# Patient Record
Sex: FEMALE | Race: WHITE
[De-identification: names, ages, dates, MRNs, and addresses within clinical notes are randomized per-mention and may not be internally consistent; named-entity substitution may affect disease eponyms.]

---

## 2019-03-11 NOTE — HISTORY & PHYSICAL
History of Present Illness





- Date of Service


Date of Service for History & Physical: 03/11/19





- History of Present Illness


Admitting Diagnosis: pneumonia, hypoxia, confusion, enlarged hepatic duct


History of Present Illness: 


Dixie Headley is a 69 y/o female brought to ED by family for confusion and 

increased fatigue, chills, increased shortness of breath. She was in her usual 

state of health prior to that. She has been living with her son and DIL for the 

past year as she has been actively treated for ovarian cancer. She had her last 

treatment 4 months ago and is in remission. Past medical history history 

includes current every day smoker, COD, MI- unable to recall year, GERD, 

bipolar disorder, chronic low back pain s/p lumbar laminectomy in 1989, ovarian 

cancer s/p total hysterectomy and chemo/radiation.











Upon arrival to ED SPO2 70% RA, RR 32 and was placed on BiPAP for about an hour 

with significant improvement. Tmax in .1 rectally and was given Afirmiv. 

ABGs showed acute respiratory acidosis with improvement to a compensated state 

after BiPAP use. WBC 14.4, neutrophils 92, Hgb 11.6. ProBNP 971.50. D-dimer 

2.38. Procalcitonin 0.770. Troponin <0.010. EKG- sinus tach, borderline ST 

depression. Head CT negative. Chest CTA negative for PE, + for RLL pneumonia 

and RLL mucus plugging with a possible area of necrosis, severe intrahepatic 

bowel duct dilation. Outside hospital records reviewed, MRI abdomen done by her 

oncologist 1/25/19 2nd ovarian CA showed dilation of CBD due to obstructive 

process and calcification of distal CBD at the level of the pancreatic head, 

pancreatic atrophy. She was stable, on 3L O2 upon transfer to the floor. 














3/11/19- overnight was hypotensive, given fluid bolus with improvement in BP. 

Is resting in bed comfortably, is A&O x 3 but is disoriented to the events of 

the past 24-36 hours. Denies any EAMON, chest pain, chills. Has been afebrile. 

Family reports she lives independently in [place] but has stayed with them due 

to cancer treatment in [place]. Has cut down on her smoking to only 3 

cigarettes per day as well as her beer intake from a case per day to 3 per day.











PCP- Dr Riddle- Kelly


Oncology: Dr Qui   








Travel Screening





- Travel/Exposure Within Last 30 Days


Have you traveled within the last 30 days?: No





- Travel/Exposure Within Last Year


Have you traveled outside the [place] in the last year?: No





- Additonal Travel Details


Have you been exposed to anyone with a communicable illness?: No





- Travel Symptoms


Symptom Screening: Fever (Subjective), Weakness, Chills





Review of Systems


Constitutional: Reports: As per HPI, Weakness.  Denies: Chills, Fever, Malaise, 

Night sweats, Weight change


Eyes: Reports: As per HPI.  Denies: Eye discharge, Eye pain, Photophobia, 

Vision change


ENT: Reports: As per HPI, Congestion.  Denies: Dental pain, Ear pain, Epistaxis

, Hearing loss, Throat pain


Respiratory: Reports: As per HPI, Cough.  Denies: Dyspnea, Hemoptysis, Stridor, 

Wheezes


Cardiovascular: Reports: As per HPI.  Denies: Arrhythmia, Chest pain, Dyspnea 

on exertion, Edema, Murmurs, Orthopnea, Palpitations, Paroxysmal nocturnal 

dyspnea, Rheumatic Fever, Syncope


Endocrine: Reports: As per HPI.  Denies: Fatigue, Heat or cold intolerance, 

Polydipsia, Polyuria


Gastrointestinal: Reports: As per HPI.  Denies: Abdominal pain, Constipation, 

Diarrhea, Hematemesis, Hematochezia, Melena, Nausea, Vomiting


Genitourinary: Reports: As per HPI.  Denies: Abnormal menses, Discharge, 

Dyspareunia, Dysuria, Frequency, Hematuria, Incontinence, Retention, Urgency


Musculoskeletal: Reports: As per HPI.  Denies: Arthralgia, Back pain, Gout, 

Joint swelling, Myalgia, Neck pain


Skin: Reports: As per HPI.  Denies: Bruising, Change in color, Change in hair/

nails, Lesions, Pruritus, Rash


Neurological: Reports: As per HPI, Confusion.  Denies: Abnormal gait, Headache, 

Numbness, Paresthesias, Seizure, Tingling, Tremors, Vertigo, Weakness


Psychiatric: Reports: As per HPI.  Denies: Anxiety, Auditory hallucinations, 

Depression, Homicidal thoughts, Suicidal thoughts, Visual hallucinations


Hematological/Lymphatic: Reports: As per HPI.  Denies: Anemia, Blood Clots, 

Easy bleeding, Easy bruising, Swollen glands





Past Medical History





- SOCIAL HISTORY


Smoking Status: Light tobacco smoker (<10/day)


Alcohol Use: None


Drug Use: None





- RESPIRATORY


Hx Respiratory Disorders: Yes


Hx COPD: Yes





- CARDIOVASCULAR


Hx Cardio Disorders: Yes


Hx Hypertension: Yes





- NEURO


Hx Neuro Disorders: No





- GI


Hx GI Disorders: Yes


Hx Reflux: Yes





- 


Hx Genitourinary Disorders: No





- ENDOCRINE


Hx Endocrine Disorders: No





- MUSCULOSKELETAL


Hx Musculoskeletal Disorders: Yes





- PSYCH


Hx Psych Problems: Yes


Hx Anxiety: Yes


Hx Depression: Yes





- HEMATOLOGY/ONCOLOGY


Hx Hematology/Oncology Disorders: Yes


Hx Cancer: Yes (Ovarian Cancer)


Hx Chemotherapy: Yes (12/2018)


Hx Radiation Therapy: Yes (12/2018)





Family Medical History


Any Significant Family History?: Yes


Hx Cancer: Father, Brother/Sister


Hx Diabetes: Brother/Sister





H&P Meds/Allergies





- Allergies


Allergies: 


 Allergies











Allergy/AdvReac Type Severity Reaction Status Date / Time


 


No Known Drug Allergies Allergy   Verified 03/10/19 20:01














- Home Medications


 Home Medications











 Medication  Instructions  Recorded  Confirmed  Last Taken


 


Alprazolam 1 tab PO BID PRN 03/10/19 03/10/19 Unknown


 


Cyclobenzaprine HCl [Flexeril] 10 mg PO QHS 03/10/19 03/10/19 Unknown


 


Fluoxetine HCl [Prozac] 40 mg PO DAILY 03/10/19 03/10/19 Unknown


 


Ibuprofen [Ibu] 600 mg PO DAILY 03/10/19 03/10/19 Unknown


 


Morphine Sulfate [Morphine Sulfate 60 mg PO BID 03/10/19 03/10/19 Unknown





ER]    


 


Nifedipine [Nifedipine ER] 60 mg PO DAILY 03/10/19 03/10/19 Unknown


 


Olanzapine 10 mg PO QHS 03/10/19 03/10/19 Unknown


 


Quinapril HCl [Accupril] 40 mg PO DAILY 03/10/19 03/10/19 Unknown


 


Trazodone HCl 300 mg PO QHS 03/10/19 03/10/19 Unknown


 


Oxycodone HCl 15 mg PO Q4H PRN 03/11/19 03/11/19 Unknown


 


Ranitidine HCl 150 mg PO BID 03/11/19 03/11/19 Unknown














- Active Medications


Active Medications: 


 Current Medications





Acetaminophen (Tylenol 500mg Tab)  1,000 mg PO Q6H PRN


   PRN Reason: PAIN - MILD(1-4)/FEVER


Albuterol Sulfate (Albuterol Sulfate)  2.5 mg INH RESP.Q4H PRN


   PRN Reason: DIFFICULTY IN BREATHING


Albuterol/Ipratropium (Duoneb)  3 ml INH RESP.Q6H PRN


   PRN Reason: WHEEZING


   Last Admin: 03/11/19 11:20 Dose:  3 ml


Alprazolam (Xanax)  1 mg PO BID PRN


   PRN Reason: ANXIETY


Fluoxetine HCl (Prozac)  40 mg PO DAILY [facility]


   Last Admin: 03/11/19 10:00 Dose:  40 mg


Sodium Chloride ()  1,000 mls @ 100 mls/hr IV .Q10H PRN


   PRN Reason: LARGE VOLUME IV


CEFTRIAXONE 1GM/50ML BAG (Ceftriaxone 1 Gm-D5w Bag)  1 gm in 50 mls @ 100 mls/

hr IVPB Q24H [facility]


Ibuprofen (Motrin 600mg)  600 mg PO DAILY [facility]


   Last Admin: 03/11/19 10:00 Dose:  600 mg


Methylprednisolone Sodium Succinate (Solu-Medrol)  125 mg IVP DAILY [facility]


   Last Admin: 03/11/19 12:32 Dose:  125 mg


Morphine Sulfate ()  60 mg PO Q12H [facility]


   Last Admin: 03/11/19 10:02 Dose:  60 mg


Nifedipine (Procardia Xl)  60 mg PO DAILY [facility]


   Last Admin: 03/11/19 10:03 Dose:  60 mg


Olanzapine (Zyprexa)  10 mg PO QHS [facility]


Oxycodone HCl (Oxy Ir)  15 mg PO Q4H PRN


   PRN Reason: PAIN - MOD TO SEVERE (5-10)


Quinapril HCl (Accupril)  40 mg PO DAILY [facility]


   Last Admin: 03/11/19 10:01 Dose:  40 mg


Ranitidine HCl (Zantac)  150 mg PO BID [facility]


   Last Admin: 03/11/19 12:32 Dose:  150 mg


Trazodone HCl (Desyrel)  300 mg PO QHS [facility]











Physical Exam





- Vital Signs


Vital Signs: 


 Vital Signs - Last 24 Hrs











  Temp Pulse Pulse Pulse Resp BP BP


 


 03/11/19 11:20   89    16  


 


 03/11/19 10:00  97.3 F L    83  16   104/61


 


 03/11/19 09:00      16  


 


 03/11/19 08:17   88    18  


 


 03/11/19 06:00  97.5 F L   64   16   95/49


 


 03/11/19 03:10  97.6 F   60   20  78/59 


 


 03/11/19 02:00    73   20  84/46 


 


 03/11/19 01:30    69   18   79/44


 


 03/11/19 01:15  97.6 F   67   18   86/47


 


 03/11/19 01:02    61   24  84/44 


 


 03/10/19 23:43  97.7 F    77  16  111/63 


 


 03/10/19 23:20     65  20  99/49 


 


 03/10/19 22:24     76  24  82/48 


 


 03/10/19 22:10  102.1 F H      


 


 03/10/19 20:42   111 H    22  


 


 03/10/19 19:58  98.3 F    141 H  32 H  92/66 














  Pulse Ox


 


 03/11/19 11:20  99


 


 03/11/19 10:00  96


 


 03/11/19 09:00 


 


 03/11/19 08:17  94 L


 


 03/11/19 06:00  92 L


 


 03/11/19 03:10  85 L


 


 03/11/19 02:00  89 L


 


 03/11/19 01:30  93 L


 


 03/11/19 01:15  87 L


 


 03/11/19 01:02  94 L


 


 03/10/19 23:43  95


 


 03/10/19 23:20  96


 


 03/10/19 22:24  95


 


 03/10/19 22:10 


 


 03/10/19 20:42  95


 


 03/10/19 19:58  78 L














- General


General Appearance: Alert, Oriented x3, Cooperative, Other (disoriented)





- Head


Head exam: Normal inspection





- Eye


Eye exam: Normal appearance, PERRL, EOMI


Pupils: Normal accommodation





- ENT


ENT exam: Normal exam, Mucous membranes dry, Normal external ear exam, Normal 

orophraynx


Ear exam: Normal external inspection.  negative: External canal tenderness


Nasal Exam: Normal inspection.  negative: Discharge, Sinus tenderness


Mouth exam: Normal external inspection, Tongue normal


Teeth exam: Normal inspection.  negative: Dental caries


Throat exam: Normal inspection.  negative: Tonsillar erythema, Tonsillar exudate





- Neck


Neck exam: Normal inspection, Full ROM.  negative: Tenderness





- Respiratory


Respiratory exam: Normal lung sounds bilaterally.  negative: Accessory muscle 

use, Rales, Rhonchi





- Cardiovascular


Cardiovascular Exam: Normal rhythm, Normal heart sounds


Peripheral Pulses: 2+: Dorsalis Pedis (R), Dorsalis Pedis (L)





- GI/Abdominal


GI/Abdominal exam: Soft, Normal bowel sounds.  negative: Tenderness





- Rectal


Rectal exam: Deferred





- 


 exam: Deferred





- Extremities


Extremities exam: Normal inspection, Full ROM, Normal capillary refill.  

negative: Tenderness





- Back


Back exam: Reports: Normal inspection, Full ROM.  Denies: Muscle spasm, Rash 

noted, Tenderness





- Neurological


Neurological exam: Alert, CN II-XII intact, Oriented X3.  negative: Altered, 

Normal gait





- Psychiatric


Psychiatric exam: Normal affect, Normal mood





- Skin


Skin exam: Dry, Intact.  negative: Cyanosis





Results





- Labs


Result Diagrams: 


 03/11/19 06:20





 03/11/19 06:20


Labs Last 24 Hours: 


 Laboratory Results - last 24 hr











  03/10/19 03/10/19 03/10/19





  20:21 20:21 20:21


 


WBC   14.4 H 


 


RBC   3.87 


 


Hgb   11.6 


 


Hct   38.4 


 


MCV   99.2 H 


 


MCH   30.0 


 


MCHC   30.2 L 


 


RDW   17.9 H 


 


Plt Count   293 


 


MPV   9.0 


 


Neutrophils %   84.0 H 


 


Eosinophils %   Not Reportable 


 


Basophils %   Not Reportable 


 


Lymphocytes   8.0 L 


 


Monocytes   8.0 


 


Basophils   


 


D-Dimer    2.38 H


 


Puncture Site  Left brachial  


 


pCO2  60.4 H  


 


pO2  78.0 L  


 


HCO3  28.6 H  


 


Oxyhemoglobin  89.0 L  


 


ABG pH  7.30 L  


 


ABG O2 Saturation  93.6 L  


 


ABG Base Excess  1.6  


 


[place] Test  Pass  


 


Carboxyhemoglobin  5.6 H  


 


Methemoglobin  Not Reportable  


 


Total Hemoglobin  11.0 L  


 


Actual Respiration Rate  20.0 H  


 


FiO2    


 


Sodium   


 


Potassium   


 


Chloride   


 


Carbon Dioxide   


 


Anion Gap   


 


BUN   


 


Creatinine   


 


Estimated GFR   


 


Random Glucose   


 


Lactic Acid   


 


Calcium   


 


Total Bilirubin   


 


AST   


 


ALT   


 


Alkaline Phosphatase   


 


Troponin T   


 


NT-Pro-B Natriuret Pep   


 


Total Protein   


 


Albumin   


 


Globulin   


 


Albumin/Globulin Ratio   


 


Procalcitonin   


 


Urine Color   


 


Urine Appearance   


 


Urine pH   


 


Ur Specific Gravity   


 


Urine Protein   


 


Urine Glucose (UA)   


 


Urine Ketones   


 


Urine Blood   


 


Urine Nitrite   


 


Urine Bilirubin   


 


Urine Urobilinogen   


 


Ur Leukocyte Esterase   


 


Influenza Type A Ag   


 


Influenza Type B Ag   














  03/10/19 03/10/19 03/10/19





  20:21 22:11 22:35


 


WBC   


 


RBC   


 


Hgb   


 


Hct   


 


MCV   


 


MCH   


 


MCHC   


 


RDW   


 


Plt Count   


 


MPV   


 


Neutrophils %   


 


Eosinophils %   


 


Basophils %   


 


Lymphocytes   


 


Monocytes   


 


Basophils   


 


D-Dimer   


 


Puncture Site    Left brachial


 


pCO2    55.1 H


 


pO2    67.0 L


 


HCO3    30.4 H


 


Oxyhemoglobin    88.7 L


 


ABG pH    7.36


 


ABG O2 Saturation    93.0 L


 


ABG Base Excess    4.7 H


 


[place] Test    Pass


 


Carboxyhemoglobin    5.1 H


 


Methemoglobin    Not Reportable


 


Total Hemoglobin    9.0 L


 


Actual Respiration Rate    20.0 H


 


FiO2    


 


Sodium  138  


 


Potassium  3.4  


 


Chloride  95 L  


 


Carbon Dioxide  28.0  


 


Anion Gap  15.0  


 


BUN  13  


 


Creatinine  0.9  


 


Estimated GFR  > 60  


 


Random Glucose  211 H  


 


Lactic Acid   


 


Calcium  9.5  


 


Total Bilirubin  0.40  


 


AST  12  


 


ALT  9  


 


Alkaline Phosphatase  72  


 


Troponin T   


 


NT-Pro-B Natriuret Pep  971.50 H  


 


Total Protein  8.4  


 


Albumin  3.8 L  


 


Globulin  4.6  


 


Albumin/Globulin Ratio  0.8 L  


 


Procalcitonin   


 


Urine Color   Yellow 


 


Urine Appearance   Clear 


 


Urine pH   5.5 


 


Ur Specific Gravity   1.025 


 


Urine Protein   Trace H 


 


Urine Glucose (UA)   Negative 


 


Urine Ketones   Negative 


 


Urine Blood   Negative 


 


Urine Nitrite   Negative 


 


Urine Bilirubin   Negative 


 


Urine Urobilinogen   0.2 


 


Ur Leukocyte Esterase   Negative 


 


Influenza Type A Ag   


 


Influenza Type B Ag   














  03/10/19 03/11/19 03/11/19





  22:48 06:20 06:20


 


WBC   9.0 


 


RBC   3.78 L 


 


Hgb   11.4 L 


 


Hct   37.4 


 


MCV   98.9 H 


 


MCH   30.1 


 


MCHC   30.5 L 


 


RDW   17.6 H 


 


Plt Count   245 


 


MPV   9.1 


 


Neutrophils %   92.0 H 


 


Eosinophils %   Not Reportable 


 


Basophils %   Not Reportable 


 


Lymphocytes   6.0 L 


 


Monocytes   1.0 


 


Basophils   1.0 


 


D-Dimer   


 


Puncture Site   


 


pCO2   


 


pO2   


 


HCO3   


 


Oxyhemoglobin   


 


ABG pH   


 


ABG O2 Saturation   


 


ABG Base Excess   


 


[place] Test   


 


Carboxyhemoglobin   


 


Methemoglobin   


 


Total Hemoglobin   


 


Actual Respiration Rate   


 


FiO2   


 


Sodium    137


 


Potassium    3.8


 


Chloride    100


 


Carbon Dioxide    27.0


 


Anion Gap    10.0


 


BUN    14


 


Creatinine    0.7


 


Estimated GFR    > 60


 


Random Glucose    156 H


 


Lactic Acid  1.1  


 


Calcium    9.2


 


Total Bilirubin    0.20


 


AST    12


 


ALT    10


 


Alkaline Phosphatase    72


 


Troponin T   


 


NT-Pro-B Natriuret Pep   


 


Total Protein    8.1


 


Albumin    3.7 L


 


Globulin    4.4


 


Albumin/Globulin Ratio    0.8 L


 


Procalcitonin   


 


Urine Color   


 


Urine Appearance   


 


Urine pH   


 


Ur Specific Gravity   


 


Urine Protein   


 


Urine Glucose (UA)   


 


Urine Ketones   


 


Urine Blood   


 


Urine Nitrite   


 


Urine Bilirubin   


 


Urine Urobilinogen   


 


Ur Leukocyte Esterase   


 


Influenza Type A Ag   


 


Influenza Type B Ag   














  03/11/19 03/11/19





  06:20 12:49


 


WBC  


 


RBC  


 


Hgb  


 


Hct  


 


MCV  


 


MCH  


 


MCHC  


 


RDW  


 


Plt Count  


 


MPV  


 


Neutrophils %  


 


Eosinophils %  


 


Basophils %  


 


Lymphocytes  


 


Monocytes  


 


Basophils  


 


D-Dimer  


 


Puncture Site  


 


pCO2  


 


pO2  


 


HCO3  


 


Oxyhemoglobin  


 


ABG pH  


 


ABG O2 Saturation  


 


ABG Base Excess  


 


[place] Test  


 


Carboxyhemoglobin  


 


Methemoglobin  


 


Total Hemoglobin  


 


Actual Respiration Rate  


 


FiO2  


 


Sodium  


 


Potassium  


 


Chloride  


 


Carbon Dioxide  


 


Anion Gap  


 


BUN  


 


Creatinine  


 


Estimated GFR  


 


Random Glucose  


 


Lactic Acid  


 


Calcium  


 


Total Bilirubin  


 


AST  


 


ALT  


 


Alkaline Phosphatase  


 


Troponin T  < 0.010 


 


NT-Pro-B Natriuret Pep  


 


Total Protein  


 


Albumin  


 


Globulin  


 


Albumin/Globulin Ratio  


 


Procalcitonin  0.770 


 


Urine Color  


 


Urine Appearance  


 


Urine pH  


 


Ur Specific Gravity  


 


Urine Protein  


 


Urine Glucose (UA)  


 


Urine Ketones  


 


Urine Blood  


 


Urine Nitrite  


 


Urine Bilirubin  


 


Urine Urobilinogen  


 


Ur Leukocyte Esterase  


 


Influenza Type A Ag   Negative


 


Influenza Type B Ag   Negative














VTE H&P Assessment





- Risk for VTE


Risk for VTE: Yes


Risk Level: Moderate


Risk Assessment Date: 03/11/19


Risk Assessment Time: 14:47


VTE Orders Placed or Will Be Placed: Yes





Plan





- Inpatient Certification


Inpatient Certification: 


Admit to inpatient care: Based on my medical assessment, after consideration of 

patient's risk factors (age, co-morbidities and patient presenting symptoms and 

acuity), I expect that this patient will remain in the hospital greater than or 

equal to two midnights and that the services needed warrant inpatient care 

because:





Patient Risk Factors: [respiratory failure, acute mental status changes]





Estimated length of stay: [72-96 hrs]  The patient may reasonably be expected 

to be discharged or transferred to a hospital within 96 hours after admission 

to [facility].





Services needed: [nursing]





Post hospital care (if known): []





I certify that my determination is in accordance with my understanding of 

Medicare requirements for reasonable and necessary inpatient services.





03/11/19 14:48








- Detailed Diagnosis and Plan


(1) Pneumonia


Current Visit: Yes   Status: Acute   


Qualifiers: 


   Pneumonia type: due to unspecified organism   Laterality: right   Lung 

location: lower lobe of lung   Qualified Code(s): J18.1 - Lobar pneumonia, 

unspecified organism   


Base Code: J18.9 - PNEUMONIA, UNSPECIFIED ORGANISM   Comment: 3/11/19


- CTA chest- RLL pneumonia, possible necrosis, recommend repeat Chest CTA in 3 

months


- Duoneb Q 6r WA, Albuterol 2.5mg Q 4hr PRN


- Rocephin 1gm BID


- Azithromycin 50mg IV Q 24 hrs


- O2 to keep SPO2> 88%- currently 94% on 4L, in no respiratory distress


- Solumedrol 125mg IVP QD


- BC x2 pending


- Procalcitonin in am


- WBC normalized this am, neutrophils 92   





(2) Hypoxia


Current Visit: Yes   Status: Acute   Base Code: R09.02 - HYPOXEMIA   Comment: 3/

11/19


- See above


- Significant improvement ater BiPAP application in the ED, has not required 

since admission


- O2 to keep SPO2>88%


- Influenza PCR pending   





(3) Chronic pain


Current Visit: Yes   Status: Acute   Base Code: G89.29 - OTHER CHRONIC PAIN   

Comment: 3/11/19


- Chronic low back pain, s/p lumbar laminectomy in 1989, recent chemo/radiation 

for ovarian cancer


- Oxy IR 15mg Q 4hr PRN per home dosing


- Morphine Sulfate 60mg Q 12 hr per home dosing


   





(4) HTN (hypertension)


Current Visit: Yes   Status: Acute   Base Code: I10 - ESSENTIAL (PRIMARY) 

HYPERTENSION   Comment: 3/11/19


- Hypotensive overnight, improved after 250ml fluid bolus


- Nursing to hold Accupril and Procardia if SBP<100


-    





(5) DVT prophylaxis


Current Visit: Yes   Status: Acute   Base Code: [id number] -    Comment: 3/11/19


- Nursing to encourage ambulation


- Lovenox 40mg SQ QD   





(6) Full code status


Current Visit: Yes   Status: Acute   Base Code: Z78.9 - OTHER SPECIFIED HEALTH 

STATUS   Comment: 3/11/19

## 2019-03-11 NOTE — EMERGENCY DEPARTMENT RECORD
History of Present Illness





- General


Chief Complaint: Cough


Stated Complaint: COUGH,CONFUSION


Time Seen by Provider: 03/10/19 20:19


Source: Family


Mode of Arrival: Ambulatory


Limitations: Altered mental status





- History of Present Illness


Initial Comments: 





pt brought in by family because she has been acting confused since the day PTA.

  she has a hx of ovarian ca and finished chemo and radiation 4 mos ago.  she 

has been increasingly sob.  pt has also had chills and shakes and been sleeping 

a lot.  her sats aupon arrival were in the 70s.  she does not wear O2.  she 

still smokes


Onset/Timin


Severity: Severe


Consistency: Constant, Getting worse


Improves With: Nothing


Known History Of: COPD


Context: Recent illness


Associated Symptoms: Cough





- Related Data


 Home Medications











 Medication  Instructions  Recorded  Confirmed  Last Taken


 


Alprazolam 1 tab PO BID PRN 03/10/19 03/10/19 Unknown


 


Cyclobenzaprine HCl [Flexeril] 10 mg PO QHS 03/10/19 03/10/19 Unknown


 


Fluoxetine HCl [Prozac] 40 mg PO DAILY 03/10/19 03/10/19 Unknown


 


Ibuprofen [Ibu] 600 mg PO DAILY 03/10/19 03/10/19 Unknown


 


Morphine Sulfate [Morphine Sulfate 60 mg PO BID 03/10/19 03/10/19 Unknown





ER]    


 


Nifedipine [Nifedipine ER] 60 mg PO DAILY 03/10/19 03/10/19 Unknown


 


Olanzapine 10 mg PO QHS 03/10/19 03/10/19 Unknown


 


Oxycodone HCl/Acetaminophen 1 tab PO Q4H PRN 03/10/19 03/10/19 Unknown





[Percocet 5mg/325mg]    


 


Quinapril HCl [Accupril] 40 mg PO DAILY 03/10/19 03/10/19 Unknown


 


Trazodone HCl 300 mg PO QHS 03/10/19 03/10/19 Unknown











 Allergies











Allergy/AdvReac Type Severity Reaction Status Date / Time


 


No Known Drug Allergies Allergy   Verified 03/10/19 20:01














Travel Screening





- Travel/Exposure Within Last 30 Days


Have you traveled within the last 30 days?: No





- Travel Symptoms


Symptom Screening: None





Review of Systems


Reviewed: No additional complaints except as noted below


Constitutional: Reports: As per HPI, Chills, Fever, Weakness.  Denies: Malaise, 

Night sweats, Weight change


Eyes: Reports: As per HPI.  Denies: Eye discharge, Eye pain, Photophobia, 

Vision change


ENT: Reports: As per HPI, Congestion.  Denies: Dental pain, Ear pain, Epistaxis

, Hearing loss, Throat pain


Respiratory: Reports: As per HPI, Cough, Dyspnea.  Denies: Hemoptysis, Stridor, 

Wheezes


Cardiovascular: Reports: As per HPI.  Denies: Arrhythmia, Chest pain, Dyspnea 

on exertion, Edema, Murmurs, Orthopnea, Palpitations, Paroxysmal nocturnal 

dyspnea, Rheumatic Fever, Syncope


Endocrine: Reports: As per HPI.  Denies: Fatigue, Heat or cold intolerance, 

Polydipsia, Polyuria


Gastrointestinal: Reports: As per HPI.  Denies: Abdominal pain, Constipation, 

Diarrhea, Hematemesis, Hematochezia, Melena, Nausea, Vomiting


Genitourinary: Reports: As per HPI.  Denies: Abnormal menses, Discharge, 

Dyspareunia, Dysuria, Frequency, Hematuria, Incontinence, Retention, Urgency


Musculoskeletal: Reports: As per HPI.  Denies: Arthralgia, Back pain, Gout, 

Joint swelling, Myalgia, Neck pain


Skin: Reports: As per HPI.  Denies: Bruising, Change in color, Change in hair/

nails, Lesions, Pruritus, Rash


Neurological: Reports: As per HPI, Confusion.  Denies: Abnormal gait, Headache, 

Numbness, Paresthesias, Seizure, Tingling, Tremors, Vertigo, Weakness


Psychiatric: Reports: As per HPI.  Denies: Anxiety, Auditory hallucinations, 

Depression, Homicidal thoughts, Suicidal thoughts, Visual hallucinations


Hematological/Lymphatic: Reports: As per HPI.  Denies: Anemia, Blood Clots, 

Easy bleeding, Easy bruising, Swollen glands





Past Medical History





- SOCIAL HISTORY


Smoking Status: Light tobacco smoker (<10/day)


Alcohol Use: None


Drug Use: None





- RESPIRATORY


Hx Respiratory Disorders: Yes


Hx COPD: Yes





- CARDIOVASCULAR


Hx Cardio Disorders: Yes


Hx Heart Attack: Yes





- NEURO


Hx Neuro Disorders: No





- GI


Hx GI Disorders: Yes


Hx Reflux: Yes





- 


Hx Genitourinary Disorders: No





- ENDOCRINE


Hx Endocrine Disorders: No





- MUSCULOSKELETAL


Hx Musculoskeletal Disorders: Yes





- PSYCH


Hx Psych Problems: Yes


Hx Anxiety: Yes


Hx Depression: Yes





- HEMATOLOGY/ONCOLOGY


Hx Hematology/Oncology Disorders: Yes


Hx Cancer: Yes (Ovarian Cancer)


Hx Chemotherapy: Yes (2018)


Hx Radiation Therapy: Yes (2018)





Family Medical History


Any Significant Family History?: Yes


Hx Cancer: Father, Brother/Sister


Hx Diabetes: Brother/Sister





Physical Exam





- General


General Appearance: Alert, Cooperative, Severe distress, Other (confused)





- Head


Head exam: Normal inspection





- Eye


Eye exam: Normal appearance, PERRL, EOMI


Pupils: Normal accommodation





- ENT


ENT exam: Normal exam, Mucous membranes dry, Normal external ear exam, Normal 

orophraynx


Ear exam: Normal external inspection.  negative: External canal tenderness


Nasal Exam: Normal inspection.  negative: Discharge, Sinus tenderness


Mouth exam: Normal external inspection, Tongue normal


Teeth exam: Normal inspection.  negative: Dental caries


Throat exam: Normal inspection.  negative: Tonsillar erythema, Tonsillar exudate





- Neck


Neck exam: Normal inspection, Full ROM.  negative: Tenderness





- Respiratory


Respiratory exam: Accessory muscle use, Rales, Respiratory distress, Rhonchi





- Cardiovascular


Cardiovascular Exam: Normal rhythm, Normal heart sounds, Tachycardia





- GI/Abdominal


GI/Abdominal exam: Soft, Normal bowel sounds.  negative: Tenderness





- Rectal


Rectal exam: Deferred





- 


 exam: Deferred





- Extremities


Extremities exam: Normal inspection, Full ROM, Normal capillary refill.  

negative: Tenderness





- Back


Back exam: Reports: Normal inspection, Full ROM.  Denies: Muscle spasm, Rash 

noted, Tenderness





- Neurological


Neurological exam: Altered.  negative: Normal gait, Oriented X3





- Psychiatric


Psychiatric exam: Normal affect, Normal mood





- Skin


Skin exam: Cyanosis, Dry, Intact





Course





 Vital Signs











  03/10/19 03/10/19 03/10/19





  19:58 20:42 22:10


 


Temperature 98.3 F  102.1 F H


 


Pulse Rate  111 H 


 


Pulse Rate [ 141 H  





Pulse Ox Probe]   


 


Respiratory 32 H 22 





Rate   


 


Blood Pressure 92/66  





[Left Arm]   


 


Pulse Ox 78 L 95 














  03/10/19 03/10/19 03/10/19





  22:24 23:20 23:43


 


Temperature   97.7 F


 


Pulse Rate   


 


Pulse Rate [ 76 65 77





Pulse Ox Probe]   


 


Respiratory 24 20 16





Rate   


 


Blood Pressure 82/48 99/49 111/63





[Left Arm]   


 


Pulse Ox 95 96 95














- Reevaluation(s)


Reevaluation #1: 





19 00:36


pt was immediately placed on bipap and given treatment. she gradually improved. 

her vitals improved. ct shows pneumonia with concern for biliary duct 

dilatation. pt has no ap. her mentation improved





Medical Decision Making





- Lab Data


Result diagrams: 


 03/10/19 20:21





 03/10/19 20:21





 Lab Results











  03/10/19 03/10/19 03/10/19 Range/Units





  20:21 20:21 20:21 


 


WBC   14.4 H   (4.2-12.2)  K/uL


 


RBC   3.87   (3.80-5.40)  M/uL


 


Hgb   11.6   (11.6-16.0)  gm/dl


 


Hct   38.4   (35.0-47.0)  %


 


MCV   99.2 H   (81-97)  fl


 


MCH   30.0   (27-33)  pg


 


MCHC   30.2 L   (32-36)  g/dl


 


RDW   17.9 H   (11.5-14.5)  %


 


Plt Count   293   (130-400)  K/uL


 


MPV   9.0   (7.4-10.4)  fl


 


Neutrophils %   84.0 H   (47-80)  %


 


Eosinophils %   Not Reportable   


 


Basophils %   Not Reportable   


 


Lymphocytes   8.0 L   (16-45)  %


 


Monocytes   8.0   (0-9)  %


 


D-Dimer    2.38 H  (0-0.59)  mg/L FEU


 


Puncture Site  Left brachial    


 


pCO2  60.4 H    (35-48)  mmHg


 


pO2  78.0 L    ()  mmHg


 


HCO3  28.6 H    (18-23)  mmol/L


 


Oxyhemoglobin  89.0 L    (94-99)  % vol


 


ABG pH  7.30 L    (7.35-7.45)  


 


ABG O2 Saturation  93.6 L    (95-98)  %


 


ABG Base Excess  1.6    (-2 - 3)  mmol/L


 


[place] Test  Pass    


 


Carboxyhemoglobin  5.6 H    (0-1.5)  %


 


Methemoglobin  Not Reportable    


 


Total Hemoglobin  11.0 L    (11.6-16)  g/dl


 


Actual Respiration Rate  20.0 H    (10-18)  /MIN


 


FiO2      %


 


Sodium     (136-145)  mmol/L


 


Potassium     (3.4-4.5)  mmol/L


 


Chloride     ()  mmol/L


 


Carbon Dioxide     (22-29)  mmol/L


 


Anion Gap     (7-16)  


 


BUN     (8-23)  mg/dL


 


Creatinine     (0.5-0.9)  mg/dL


 


Estimated GFR     mL/min


 


Random Glucose     ()  mg/dL


 


Lactic Acid     (0.5-2.2)  mmol/L


 


Calcium     (8.8-10.2)  mg/dL


 


Total Bilirubin     (0.2-1.0)  mg/dL


 


AST     (10.0-35.0)  U/L


 


ALT     (<33)  U/L


 


Alkaline Phosphatase     ()  U/L


 


NT-Pro-B Natriuret Pep     (<125)  pg/mL


 


Total Protein     (6.6-8.7)  g/dL


 


Albumin     (4.0-5.0)  g/dL


 


Globulin     (1.4-4.8)  gm/dL


 


Albumin/Globulin Ratio     (1.1-1.8)  


 


Urine Color     


 


Urine Appearance     


 


Urine pH     (5.0-8.0)  


 


Ur Specific Gravity     (1.002-1.030)  


 


Urine Protein     (NEGATIVE)  


 


Urine Glucose (UA)     (NEGATIVE)  


 


Urine Ketones     (NEGATIVE)  


 


Urine Blood     (NEGATIVE)  


 


Urine Nitrite     (NEGATIVE)  


 


Urine Bilirubin     (NEGATIVE)  


 


Urine Urobilinogen     (0.20 - 1.00)  E.U./dL


 


Ur Leukocyte Esterase     (NEGATIVE)  














  03/10/19 03/10/19 03/10/19 Range/Units





  20:21 22:11 22:35 


 


WBC     (4.2-12.2)  K/uL


 


RBC     (3.80-5.40)  M/uL


 


Hgb     (11.6-16.0)  gm/dl


 


Hct     (35.0-47.0)  %


 


MCV     (81-97)  fl


 


MCH     (27-33)  pg


 


MCHC     (32-36)  g/dl


 


RDW     (11.5-14.5)  %


 


Plt Count     (130-400)  K/uL


 


MPV     (7.4-10.4)  fl


 


Neutrophils %     (47-80)  %


 


Eosinophils %     


 


Basophils %     


 


Lymphocytes     (16-45)  %


 


Monocytes     (0-9)  %


 


D-Dimer     (0-0.59)  mg/L FEU


 


Puncture Site    Left brachial  


 


pCO2    55.1 H  (35-48)  mmHg


 


pO2    67.0 L  ()  mmHg


 


HCO3    30.4 H  (18-23)  mmol/L


 


Oxyhemoglobin    88.7 L  (94-99)  % vol


 


ABG pH    7.36  (7.35-7.45)  


 


ABG O2 Saturation    93.0 L  (95-98)  %


 


ABG Base Excess    4.7 H  (-2 - 3)  mmol/L


 


[place] Test    Pass  


 


Carboxyhemoglobin    5.1 H  (0-1.5)  %


 


Methemoglobin    Not Reportable  


 


Total Hemoglobin    9.0 L  (11.6-16)  g/dl


 


Actual Respiration Rate    20.0 H  (10-18)  /MIN


 


FiO2      %


 


Sodium  138    (136-145)  mmol/L


 


Potassium  3.4    (3.4-4.5)  mmol/L


 


Chloride  95 L    ()  mmol/L


 


Carbon Dioxide  28.0    (22-29)  mmol/L


 


Anion Gap  15.0    (7-16)  


 


BUN  13    (8-23)  mg/dL


 


Creatinine  0.9    (0.5-0.9)  mg/dL


 


Estimated GFR  > 60    mL/min


 


Random Glucose  211 H    ()  mg/dL


 


Lactic Acid     (0.5-2.2)  mmol/L


 


Calcium  9.5    (8.8-10.2)  mg/dL


 


Total Bilirubin  0.40    (0.2-1.0)  mg/dL


 


AST  12    (10.0-35.0)  U/L


 


ALT  9    (<33)  U/L


 


Alkaline Phosphatase  72    ()  U/L


 


NT-Pro-B Natriuret Pep  971.50 H    (<125)  pg/mL


 


Total Protein  8.4    (6.6-8.7)  g/dL


 


Albumin  3.8 L    (4.0-5.0)  g/dL


 


Globulin  4.6    (1.4-4.8)  gm/dL


 


Albumin/Globulin Ratio  0.8 L    (1.1-1.8)  


 


Urine Color   Yellow   


 


Urine Appearance   Clear   


 


Urine pH   5.5   (5.0-8.0)  


 


Ur Specific Gravity   1.025   (1.002-1.030)  


 


Urine Protein   Trace H   (NEGATIVE)  


 


Urine Glucose (UA)   Negative   (NEGATIVE)  


 


Urine Ketones   Negative   (NEGATIVE)  


 


Urine Blood   Negative   (NEGATIVE)  


 


Urine Nitrite   Negative   (NEGATIVE)  


 


Urine Bilirubin   Negative   (NEGATIVE)  


 


Urine Urobilinogen   0.2   (0.20 - 1.00)  E.U./dL


 


Ur Leukocyte Esterase   Negative   (NEGATIVE)  














  03/10/19 Range/Units





  22:48 


 


WBC   (4.2-12.2)  K/uL


 


RBC   (3.80-5.40)  M/uL


 


Hgb   (11.6-16.0)  gm/dl


 


Hct   (35.0-47.0)  %


 


MCV   (81-97)  fl


 


MCH   (27-33)  pg


 


MCHC   (32-36)  g/dl


 


RDW   (11.5-14.5)  %


 


Plt Count   (130-400)  K/uL


 


MPV   (7.4-10.4)  fl


 


Neutrophils %   (47-80)  %


 


Eosinophils %   


 


Basophils %   


 


Lymphocytes   (16-45)  %


 


Monocytes   (0-9)  %


 


D-Dimer   (0-0.59)  mg/L FEU


 


Puncture Site   


 


pCO2   (35-48)  mmHg


 


pO2   ()  mmHg


 


HCO3   (18-23)  mmol/L


 


Oxyhemoglobin   (94-99)  % vol


 


ABG pH   (7.35-7.45)  


 


ABG O2 Saturation   (95-98)  %


 


ABG Base Excess   (-2 - 3)  mmol/L


 


[place] Test   


 


Carboxyhemoglobin   (0-1.5)  %


 


Methemoglobin   


 


Total Hemoglobin   (11.6-16)  g/dl


 


Actual Respiration Rate   (10-18)  /MIN


 


FiO2   %


 


Sodium   (136-145)  mmol/L


 


Potassium   (3.4-4.5)  mmol/L


 


Chloride   ()  mmol/L


 


Carbon Dioxide   (22-29)  mmol/L


 


Anion Gap   (7-16)  


 


BUN   (8-23)  mg/dL


 


Creatinine   (0.5-0.9)  mg/dL


 


Estimated GFR   mL/min


 


Random Glucose   ()  mg/dL


 


Lactic Acid  1.1  (0.5-2.2)  mmol/L


 


Calcium   (8.8-10.2)  mg/dL


 


Total Bilirubin   (0.2-1.0)  mg/dL


 


AST   (10.0-35.0)  U/L


 


ALT   (<33)  U/L


 


Alkaline Phosphatase   ()  U/L


 


NT-Pro-B Natriuret Pep   (<125)  pg/mL


 


Total Protein   (6.6-8.7)  g/dL


 


Albumin   (4.0-5.0)  g/dL


 


Globulin   (1.4-4.8)  gm/dL


 


Albumin/Globulin Ratio   (1.1-1.8)  


 


Urine Color   


 


Urine Appearance   


 


Urine pH   (5.0-8.0)  


 


Ur Specific Gravity   (1.002-1.030)  


 


Urine Protein   (NEGATIVE)  


 


Urine Glucose (UA)   (NEGATIVE)  


 


Urine Ketones   (NEGATIVE)  


 


Urine Blood   (NEGATIVE)  


 


Urine Nitrite   (NEGATIVE)  


 


Urine Bilirubin   (NEGATIVE)  


 


Urine Urobilinogen   (0.20 - 1.00)  E.U./dL


 


Ur Leukocyte Esterase   (NEGATIVE)  














Critical Care Time


Critical Care Time: Yes


Total Critical Care Time: 60





Disposition


Disposition: Admit


Clinical Impression: 


 Hypoxia





Pneumonia


Qualifiers:


 Pneumonia type: due to unspecified organism Laterality: right Lung location: 

lower lobe of lung Qualified Code(s): J18.1 - Lobar pneumonia, unspecified 

organism





Disposition: Still a Patient at [facility]


Decision to Admit: Admit from ER


Decision to Admit Date: 19


Decision to Admit Time: 00:43





Quality





- Quality Measures


Quality Measures: N/A





- Blood Pressure Screening


Does Patient Have Any of the Following: No


Blood Pressure Classification: Normal BP Reading


Systolic Measurement: 111


Diastolic Measurement: 63


Screening for High Blood Pressure: < Normal BP, F/U Not Required > []

## 2019-03-11 NOTE — CT SCAN REPORT
EXAM:  CT OF THE HEAD WITHOUT CONTRAST 



HISTORY:  CONFUSION.   



TECHNIQUE:  Standard CT imaging of the head was obtained without intravenous 
contrast.  



Comparison:  None.  



Hand dominance:  Unknown.  



FINDINGS:  The ventricles and sulci are normal in size.  No intracranial 
hemorrhage, extraaxial fluid collection, or large acute infarct is identified.  
No significant mass effect or midline shift.  The basal cisterns are 
maintained.  The visualized paranasal sinuses and mastoid air cells are clear.  



IMPRESSION:  

NEGATIVE NONCONTRAST HEAD CT.  



JOB NUMBER:  [id number]
[facility]

## 2019-03-11 NOTE — CT ANGIOGRAM REPORT
EXAM:  CTA OF THE CHEST WITH  CONTRAST 



HISTORY:  SHORTNESS OF BREATH. 



TECHNIQUE:  Standard CT angiography of the chest was obtained after intravenous 
contrast.  Coronal and sagittal reformations are provided and MIP reformations 
are also created.  



Comparison:  None.  



FINDINGS:  There is moderate stenosis in the proximal left subclavian artery by 
noncalcified plaque.  No filling defect is identified within the pulmonary 
arteries.  There is a right chest wall robyn-cath in place.  There are multiple 
enlarged mediastinal lymph nodes measuring up to 16 mm in the lower right 
paratracheal region and 22 mm in the subcarinal region.  There are also 
enlarged right hilar lymph nodes measuring up to 17 mm.  There is a trace right 
pleural effusion.  There is severe centrilobular emphysema.  There is extensive 
mucous plugging in the right lower lobe.  There is consolidation of the right 
lower lobe dependently with areas of nonenhancement which may represent areas 
of necrosis.  No destructive osseous lesion is identified.  There is severe 
intrahepatic bowel like dilatation within the visualized aspects of the liver.  
There is also a large amount of stool in the visualized colon.  



IMPRESSION:  

1.  NO EVIDENCE FOR A PULMONARY EMBOLUS.  



2.  RIGHT LOWER LOBE CONSOLIDATION WHICH MAY REPRESENT PNEUMONIA OR ASPIRATION 
GIVEN THE EXTENSIVE RIGHT LOWER LOBE MUCOUS PLUGGING.  THERE ARE AREAS OF 
NONENHANCEMENT WITHIN THE CONSOLIDATION WHICH MAY REPRESENT AREAS OF NECROSIS.  
FOLLOW-UP CHEST CT IS RECOMMENDED IN THREE MONTHS TO INSURE RESOLUTION AS AN 
UNDERLYING MASS IS NOT EXCLUDED.  



3.  MARKED RIGHT HILAR AND MEDIASTINAL ADENOPATHY, WHICH CAN BE FURTHER 
ASSESSED ON FOLLOW-UP CHEST CT.  



4.  SEVERE EMPHYSEMA.  



5.  SEVERE INTRAHEPATIC BOWEL DUCT DILATATION.  THERE ARE NO PRIORS AVAILABLE 
FOR COMPARISON.  THIS COULD BE FURTHER ASSESSED WITH CONTRAST ENHANCED CT OF 
THE ABDOMEN AND PELVIS.  



6.  LARGE COLONIC STOOL BURDEN.  



JOB NUMBER:  [id number]
[facility]

## 2019-03-13 NOTE — DISCHARGE NOTE
VTE H&P Assessment





- Risk for VTE


Risk for VTE: Yes


Risk Level: Moderate


Risk Assessment Date: 03/11/19


Risk Assessment Time: 14:47


VTE Orders Placed or Will Be Placed: Yes





Discharge Medications





- Discharge Medications


Prescriptions: 


Azithromycin [Zithromax] 500 mg PO DAILY #8 tab


Cephalexin [Keflex] 500 mg PO QID #40 cap


Prednisone [Prednisone 10Mg] 10 mg PO ASDIR #30 tab


Home Medications: 


 Ambulatory Orders





Alprazolam 1 tab PO BID PRN 03/10/19 [Last Taken Unknown]


Cyclobenzaprine HCl [Flexeril] 10 mg PO QHS 03/10/19 [Last Taken Unknown]


Fluoxetine HCl [Prozac] 40 mg PO DAILY 03/10/19 [Last Taken Unknown]


Ibuprofen [Ibu] 600 mg PO DAILY 03/10/19 [Last Taken Unknown]


Morphine Sulfate [Morphine Sulfate ER] 60 mg PO BID 03/10/19 [Last Taken Unknown

]


Nifedipine [Nifedipine ER] 60 mg PO DAILY 03/10/19 [Last Taken Unknown]


Olanzapine 10 mg PO QHS 03/10/19 [Last Taken Unknown]


Quinapril HCl [Accupril] 40 mg PO DAILY 03/10/19 [Last Taken Unknown]


Trazodone HCl 300 mg PO QHS 03/10/19 [Last Taken Unknown]


Oxycodone HCl 15 mg PO Q4H PRN 03/11/19 [Last Taken Unknown]


Ranitidine HCl 150 mg PO BID 03/11/19 [Last Taken Unknown]


Azithromycin [Zithromax] 500 mg PO DAILY #8 tab 03/13/19 [Last Taken Unknown]


Cephalexin [Keflex] 500 mg PO QID #40 cap 03/13/19 [Last Taken Unknown]


Prednisone [Prednisone 10Mg] 10 mg PO ASDIR #30 tab 03/13/19 [Last Taken Unknown

]











Discharge Note





- Date


Date of Discharge Note: 03/13/19


Disposition: Home, Self-Care


Condition: (2) Stable


Additional Instructions: 


follow up with family  in one week and if not able to get back to [place] 

she would follow up with Dr Shah on Monday at 10:00 am in my office


Patient needs oxygen 2 liters at rest and 4 liters with ambulation will set up 

home oxygen.


Prescriptions: 


Azithromycin [Zithromax] 500 mg PO DAILY #8 tab


Cephalexin [Keflex] 500 mg PO QID #40 cap


Prednisone [Prednisone 10Mg] 10 mg PO ASDIR #30 tab


Forms:  Patient Portal Access

## 2019-03-14 NOTE — DISCHARGE SUMMARY
DATE: 03/13/2019 at 10:59 a.m. 



DISCHARGE DIAGNOSES: 

1. Right lower lobe pneumonia. 

2. Chronic obstructive pulmonary disease. 

3. Ovarian cancer. 

4. Recent chemotherapy finished 3-4 weeks ago. She states that she is remission. She is being 
treated at [facility] in the oncology group. 

5. Tobacco use. 

6. Anxiety disorder. 

7. Chronic low back pain and on narcotics. 

8. Gastroesophageal reflux disease. 

9. Hypertension. 



ATTENDING PHYSICIAN: Kenny Shah DO



REASON FOR HOSPITALIZATION: This 68-year-old female presented to the emergency department confused, 
coughing, chills, and shakes. Evaluated in the emergency department by Dr. Burnett and admitted to the 
hospital for pneumonia, right lower lobe pneumonia, for IV antibiotics and oxygen therapy and 
further evaluation. 



SIGNIFICANT FINDINGS: D-dimer was elevated with a value of 2.38. CTA of the chest was done which 
showed no PEs and an infiltrate in the right lower lobe. Her white count initially was 14,400, 
hemoglobin 11.6, platelets 293,000. Blood gas revealed pH of 7.30, PO2 is 78, PCO2 60.4 on 35% 
oxygen. 



SIGNIFICANT FINDINGS: Sodium on discharge is 137, potassium 3.8, chloride 100, BUN 14, creatinine 
0.7. Her lactic acid in the ER was 1.1. Her total bili is 0.2, AST 12, ALT 10, troponin T was 
negative. She had a procalcitonin which was elevated at 0.77, dropped down to 0.29 on 03/13/2019. 
Her urine was negative. Influenza was negative in A and B. Her white count on 03/11/2019 was 9000, 
hemoglobin 11.4. Head CT negative, non-contrast head CT. The CTA chest showed no pulmonary embolism. 
Right lower lobe consolidation which may represent pneumonia. Right hilar and mediastinal adenopathy 
which can be further assessed with followup chest CT. In view of her ovarian cancer diagnosis, this 
will be followed up. Severe emphysema. Severe intrahepatic bile duct dilation. No prior available 
comparisons. EKG showing sinus tachycardia, some ST depression in V4, V5, and V6, nonspecific. 



HOSPITAL COURSE: The patient was started on antibiotics. Rocephin and azithromycin. Gradually 
improved. 



CONDITION ON DISCHARGE: Improved but still requiring oxygen. Her pulse ox on room air was 90% to 
91%; however, when she moved around the bed to get up, it dropped down to 85% and then with walking, 
she required 4L/min nasal cannula to keep her above 87%. It dropped down to 80% with 2L of O2 on. 
See the oxygen qualifier. 



DISCHARGE INSTRUCTIONS: The patient will be discharged to follow up with her family doctor or myself 
on Monday. The discharge medications are azithromycin 500 mg daily x8 pills, Keflex 500 q.i.d. for 
40 pills, prednisone 10 mg pills tapered from 40 mg a day for 3 days, 30 mg a day for 3 days, 20 mg 
a day for 3 days, and 10 mg a day for 3 days. Continue her home medications of Xanax 1 mg b.i.d. 
p.r.n., Flexeril 10 mg at h.s., Prozac 40 mg daily, ibuprofen 600 mg daily p.r.n., morphine sulfate 
extended release 60 mg b.i.d. as she was before, nifedipine 60 mg daily, olanzapine 10 mg at h.s., 
Accupril 40 mg daily, trazodone 300 mg at h.s., oxycodone 50 mg q.4 h. p.r.n., ranitidine 150 mg 
b.i.d. I instructed her to follow up with her family doctor in [place]. If she cannot follow up 
with the family doctor, follow up with me in the office on Monday at 10 a.m. The patient needs 2L O2 
at rest, 4L oxygen with ambulation. See home qualifier at [facility].



VINNY

## 2019-06-07 ENCOUNTER — HOSPITAL ENCOUNTER (EMERGENCY)
Dept: HOSPITAL 59 - ER | Age: 69
Discharge: HOME | End: 2019-06-07
Payer: COMMERCIAL

## 2019-06-07 DIAGNOSIS — R00.0: ICD-10-CM

## 2019-06-07 DIAGNOSIS — I10: ICD-10-CM

## 2019-06-07 DIAGNOSIS — J44.1: Primary | ICD-10-CM

## 2019-06-07 DIAGNOSIS — F17.200: ICD-10-CM

## 2019-06-07 LAB
ABSOLUTE NEUTROPHIL COUNT: 5.26
ALBUMIN SERPL-MCNC: 3.4 G/DL (ref 4–5)
ALBUMIN/GLOB SERPL: 0.9 {RATIO} (ref 1.1–1.8)
ALP SERPL-CCNC: 82 U/L (ref 35–104)
ALT SERPL-CCNC: 7 U/L (ref ?–33)
ANION GAP SERPL CALC-SCNC: 10 MMOL/L (ref 7–16)
AST SERPL-CCNC: 13 U/L (ref 10–35)
BASOPHILS NFR BLD: 0.3 % (ref 0–6)
BILIRUB SERPL-MCNC: 0.2 MG/DL (ref 0.2–1)
BUN SERPL-MCNC: 10 MG/DL (ref 8–23)
CO2 SERPL-SCNC: 28 MMOL/L (ref 22–29)
CREAT SERPL-MCNC: 0.6 MG/DL (ref 0.5–0.9)
EOSINOPHIL NFR BLD: 0.9 % (ref 0–6)
ERYTHROCYTE [DISTWIDTH] IN BLOOD BY AUTOMATED COUNT: 17.1 % (ref 11.5–14.5)
EST GLOMERULAR FILTRATION RATE: > 60 ML/MIN
GLOBULIN SER-MCNC: 3.6 GM/DL (ref 1.4–4.8)
GLUCOSE SERPL-MCNC: 174 MG/DL (ref 74–109)
GRANULOCYTES NFR BLD: 79.4 % (ref 47–80)
HCT VFR BLD CALC: 34.6 % (ref 35–47)
HGB BLD-MCNC: 10.4 GM/DL (ref 11.6–16)
LYMPHOCYTES NFR BLD AUTO: 12.1 % (ref 16–45)
MCH RBC QN AUTO: 28.1 PG (ref 27–33)
MCHC RBC AUTO-ENTMCNC: 30.1 G/DL (ref 32–36)
MCV RBC AUTO: 93.8 FL (ref 81–97)
MONOCYTES NFR BLD: 7.3 % (ref 0–9)
PLATELET # BLD: 309 K/UL (ref 130–400)
PMV BLD AUTO: 8.5 FL (ref 7.4–10.4)
PROT SERPL-MCNC: 7 G/DL (ref 6.6–8.7)
RBC # BLD AUTO: 3.69 M/UL (ref 3.8–5.4)
TSH SERPL-ACNC: 2.01 UIU/ML (ref 0.27–4.2)
WBC # BLD AUTO: 6.6 K/UL (ref 4.2–12.2)

## 2019-06-07 PROCEDURE — 99284 EMERGENCY DEPT VISIT MOD MDM: CPT

## 2019-06-07 PROCEDURE — 83880 ASSAY OF NATRIURETIC PEPTIDE: CPT

## 2019-06-07 PROCEDURE — 96374 THER/PROPH/DIAG INJ IV PUSH: CPT

## 2019-06-07 PROCEDURE — 80053 COMPREHEN METABOLIC PANEL: CPT

## 2019-06-07 PROCEDURE — 94640 AIRWAY INHALATION TREATMENT: CPT

## 2019-06-07 PROCEDURE — 84443 ASSAY THYROID STIM HORMONE: CPT

## 2019-06-07 PROCEDURE — 93005 ELECTROCARDIOGRAM TRACING: CPT

## 2019-06-07 PROCEDURE — 85025 COMPLETE CBC W/AUTO DIFF WBC: CPT

## 2019-06-07 PROCEDURE — 71046 X-RAY EXAM CHEST 2 VIEWS: CPT

## 2019-06-07 PROCEDURE — 84484 ASSAY OF TROPONIN QUANT: CPT

## 2019-06-07 PROCEDURE — 93010 ELECTROCARDIOGRAM REPORT: CPT

## 2019-06-07 PROCEDURE — 93041 RHYTHM ECG TRACING: CPT

## 2019-06-07 NOTE — EMERGENCY DEPARTMENT RECORD
History of Present Illness





- General


Chief Complaint: Rapid heartbeat


Stated Complaint: RAPID HEART RATE


Time Seen by Provider: 06/07/19 20:10


Source: Patient


Mode of Arrival: Ambulatory


Limitations: No limitations





- History of Present Illness


Initial Comments: 





69 yo female presents with mild cough the last two days.  She has had some 

yellow sputum.  No blood in the sputum.  She has had pneumonia in the last three

months.  She has COPD on 2 LNC at home.  She occasionally dose her nebulizers 

but usually less than 4 times a daily.  No fever or chills.  No leg edema.  No 

nausea, vomiting, diarrhea.  She lives with her daughter currently but she was t

reated for ovarian cancer in [facility].  No chest pain or pain with 

inspiration. She is in remission from her ovarian cancer (9 months).  She 

continues to smoke "alot".


MD Complaint: Palpitations, Rapid heart beat


-: Days(s)


Context: Other


Arrythmia History: Other


Associated Symptoms: Cough





- Related Data


                                  Previous Rx's











 Medication  Instructions  Recorded


 


Prednisone [Prednisone 10Mg] 10 mg PO ASDIR #30 tab 03/13/19


 


Acetaminophen [Tylenol 500Mg Tab] 1,000 mg PO Q6H PRN  tablet 04/10/19


 


Albuterol Sulfate 0.083% [Neb] 2.5 mg INH RESP.Q4H.WA PRN #2 box 04/10/19





[Albuterol Sulfate]  


 


Azithromycin [Zithromax] 250 mg PO DAILY 4 Days #4 tab 04/10/19


 


Cefdinir 300 mg PO BID 9 Days #18 capsule 04/10/19


 


Docusate Sodium [Colace] 100 mg PO BID  cap 04/10/19


 


Fluoxetine HCl [Prozac] 40 mg PO DAILY  capsule 04/10/19


 


Fluticasone/Vilanterol 200/25 1 puff INH DAILY #1 inhaler 04/10/19





[Breo Ellipta 200-25 Mcg INH]  


 


Oxycodone HCl/Acetaminophen 1 each PO Q8H PRN  tablet 04/10/19





[Percocet 10mg/325mg]  


 


Quinapril HCl [Accupril] 40 mg PO DAILY  tablet 04/10/19


 


Ranitidine HCl [Zantac] 150 mg PO BID  tablet 04/10/19


 


Azithromycin [Zithromax] 250 mg PO DAILY #4 tablet 06/07/19


 


Cefdinir 300 mg PO BID #14 capsule 06/07/19


 


Prednisone [Prednisone 20Mg] 20 mg PO BID #10 tab 06/07/19











                                    Allergies











Allergy/AdvReac Type Severity Reaction Status Date / Time


 


diphenhydramine AdvReac  FATIGUE Verified 06/07/19 20:14





[From Benadryl]     














Review of Systems


Constitutional: Denies: Chills, Fever


Eyes: Denies: Eye discharge, Eye pain


ENT: Denies: Congestion, Throat pain


Respiratory: Reports: Cough, Dyspnea (chronic on home oxygen), Wheezes.  Denies:

Hemoptysis


Cardiovascular: Reports: Palpitations.  Denies: Chest pain, Edema, Syncope


Endocrine: Reports: Fatigue (chronic)


Gastrointestinal: Denies: Abdominal pain, Nausea, Vomiting


Genitourinary: Denies: Dysuria, Urgency


Musculoskeletal: Denies: Arthralgia, Back pain, Myalgia


Skin: Denies: Bruising, Change in color, Rash


Neurological: Denies: Headache, Weakness


Psychiatric: Denies: Anxiety


Hematological/Lymphatic: Denies: Easy bleeding, Easy bruising





Past Medical History





- SOCIAL HISTORY


Smoking Status: Light tobacco smoker (<10/day)





- RESPIRATORY


Hx Respiratory Disorders: Yes


Hx COPD: Yes


Hx Pneumonia: Yes





- CARDIOVASCULAR


Hx Cardio Disorders: Yes


Hx Hypertension: Yes





- NEURO


Hx Neuro Disorders: No





- GI


Hx GI Disorders: Yes


Hx Reflux: Yes


Hx Pancreatitis: Yes





- 


Hx Genitourinary Disorders: No





- ENDOCRINE


Hx Endocrine Disorders: No


Hx Diabetes: No


Hx Thyroid Disease: No





- MUSCULOSKELETAL


Hx Musculoskeletal Disorders: Yes





- PSYCH


Hx Psych Problems: Yes


Hx Anxiety: Yes


Hx Depression: Yes





- HEMATOLOGY/ONCOLOGY


Hx Hematology/Oncology Disorders: Yes


Hx Cancer: Yes (Ovarian Cancer)


Hx Chemotherapy: Yes (12/2018)


Hx Radiation Therapy: Yes (12/2018)





Family Medical History


Hx Cancer: Father, Brother/Sister


Hx Diabetes: Brother/Sister





Physical Exam





- General


General Appearance: Alert, Oriented x3, Cooperative, No acute distress


Limitations: No limitations





- Head


Head exam: Atraumatic, Normal inspection





- Eye


Eye exam: Normal appearance.  negative: Conjunctival injection, Scleral icterus





- ENT


ENT exam: Normal exam, Mucous membranes moist


Ear exam: Normal external inspection


Nasal Exam: Normal inspection


Mouth exam: Normal external inspection





- Neck


Neck exam: Normal inspection





- Respiratory


Respiratory exam: Decreased breath sounds (mild), Other (Calm breathing with a 

relaxed respiratory effort).  negative: Accessory muscle use, Prolonged 

expiratory, Respiratory distress, Rhonchi, Stridor, Wheezes





- Cardiovascular


Cardiovascular Exam: Regular rate, Normal rhythm, Normal heart sounds


Peripheral Pulses: 2+: Radial (R), Radial (L)





- GI/Abdominal


GI/Abdominal exam: Soft.  negative: Tenderness





- Rectal


Rectal exam: Deferred





- 


 exam: Deferred





- Extremities


Extremities exam: negative: Pedal edema, Tenderness





- Back


Back exam: Reports: Full ROM.  Denies: CVA tenderness (R), CVA tenderness (L)





- Neurological


Neurological exam: Alert, Oriented X3





- Psychiatric


Psychiatric exam: Normal affect, Normal mood





- Skin


Skin exam: Dry, Intact, Normal color, Warm.  negative: Cyanosis, Diaphoretic, 

Erythema





Course





- Reevaluation(s)


Reevaluation #1: 


The vitals were reviewed.  No acute significant abnormality.





EKG #1:  20:13


Rate:  74


Rhythm:  sinus


Axis:   left


Intervals:   normal


ST segments:  normal


Prior:  4/6/19 no adverse changes


06/07/19 20:20





06/07/19 20:43


Antibiotics, Duoneb, and Steroids provided


06/07/19 20:45


Stable chronic anemia on the CBC


06/07/19 21:57


Thc CXR was reviewed.  Atypical pneumonia vs PVC


The results were discussed with the daughter and the patient.  The patient was 

offered OBV.  She reports she is not short of breath.  Her saturation currently 

is 96% on her baseline oxygen.  She wants to go home.  Her HR, BP, RR and 

saturations are very stable over the course of the visit.  They live in 

proximity to the hospital.  They will return for a recheck if any concerns.  She

does not require additional oxygen or respiratory care that can not be provided 

at home at this time.  I reassured her she may and should return for a recheck 

if she does not feeling improved in the next 12-24 hours


06/07/19 22:09


Troponin is normal


Mild elevation of ProBNP.  Likely her current symptoms are COPD with mild early 

pneumonia not CHF.  She has not signs of peripheral edema.  I again offered an 

overnight monitoring but she wants to go home and will reliably return if any 

concerns.





Medical Decision Making





- Lab Data


Result diagrams: 


                                 06/07/19 20:25





                                 06/07/19 20:25





Disposition


Disposition: Discharge


Clinical Impression: 


 Bronchitis





COPD (chronic obstructive pulmonary disease)


Qualifiers:


 COPD type: COPD with acute exacerbation Qualified Code(s): J44.1 - Chronic 

obstructive pulmonary disease with (acute) exacerbation





Disposition: Home, Self-Care


Condition: (1) Good


Instructions:  Chronic Bronchitis (ED)


Additional Instructions: 


Call your doctor for the next available follow up appointment


Review this ER visit and the tests performed with your family doctor


Return to the ER for a recheck if worse, any new concerns or questions


Take the prescriptions provided as directed


Prescriptions: 


Cefdinir 300 mg PO BID #14 capsule


Prednisone [Prednisone 20Mg] 20 mg PO BID #10 tab


Azithromycin [Zithromax] 250 mg PO DAILY #4 tablet


Forms:  Patient Portal Access


Time of Disposition: 22:11





Quality





- Quality Measures


Quality Measures: N/A





- Blood Pressure Screening


Does Patient Have Any of the Following: Active Dx of HTN


Blood Pressure Classification: Normal BP Reading


Systolic Measurement: 107


Diastolic Measurement: 56


Screening for High Blood Pressure: Patient Exclusion, Hx of HTN []

## 2019-06-10 NOTE — RADIOLOGY REPORT
EXAM:  CHEST 2 VIEWS



HISTORY:  CHEST PAIN. COUGH.



TECHNIQUE:  Upright PA and lateral views of the chest.



COMPARISON:  Two-view chest radiographic examination dated 04/06/2019. CTA of 
the chest dated 04/09/2019.



FINDINGS:  A right internal jugular Njdllz-[id number]-[id number] catheter remains in place with
its tip in the mid to upper SVC.



The heart projects borderline to mildly enlarged. There is borderline pulmonary 
venous hypertension. Reticular opacity prominence is identified appearing mildly
worsened in the interval. Diagnostic considerations include edema or atypical 
pneumonitis. Minor patchy opacities in each lung base consistent with 
atelectasis, edema, or infiltrate. No gross lung consolidation, costophrenic 
angle blunting, or pneumothorax.



IMPRESSION:  

1.  RIGHT INTERNAL JUGULAR [id number] CATHETER REMAINS IN PLACE, UNCHANGED.

2.  THE HEART PROJECTS BORDERLINE TO MILDLY ENLARGED WITH BORDERLINE PULMONARY 
VENOUS HYPERTENSION.

3.  RETICULAR OPACITY PROMINENCE IN EACH LUNG, MORE PRONOUNCED IN THE INTERVAL. 
DIAGNOSTIC CONSIDERATIONS INCLUDE EDEMA OR ATYPICAL PNEUMONITIS.

4.  MILD PATCHY OPACITIES IN EACH LUNG BASE CONSISTENT WITH ATELECTASIS, 
INFILTRATE, OR EDEMA.



JOB NUMBER:  [id number]

[facility]

## 2019-11-28 ENCOUNTER — HOSPITAL ENCOUNTER (OUTPATIENT)
Dept: HOSPITAL 59 - ER | Age: 69
Setting detail: OBSERVATION
LOS: 1 days | Discharge: HOME | End: 2019-11-29
Attending: INTERNAL MEDICINE | Admitting: INTERNAL MEDICINE
Payer: COMMERCIAL

## 2019-11-28 DIAGNOSIS — K21.9: ICD-10-CM

## 2019-11-28 DIAGNOSIS — Z99.81: ICD-10-CM

## 2019-11-28 DIAGNOSIS — Z85.43: ICD-10-CM

## 2019-11-28 DIAGNOSIS — J18.9: ICD-10-CM

## 2019-11-28 DIAGNOSIS — J44.1: ICD-10-CM

## 2019-11-28 DIAGNOSIS — R79.89: ICD-10-CM

## 2019-11-28 DIAGNOSIS — G89.29: ICD-10-CM

## 2019-11-28 DIAGNOSIS — F17.210: ICD-10-CM

## 2019-11-28 DIAGNOSIS — R09.02: ICD-10-CM

## 2019-11-28 DIAGNOSIS — I10: ICD-10-CM

## 2019-11-28 DIAGNOSIS — R41.82: Primary | ICD-10-CM

## 2019-11-28 LAB
ABSOLUTE NEUTROPHIL COUNT: 4.37
ALBUMIN SERPL-MCNC: 4.2 G/DL (ref 4–5)
ALBUMIN/GLOB SERPL: 1.1 {RATIO} (ref 1.1–1.8)
ALP SERPL-CCNC: 66 U/L (ref 35–104)
ALT SERPL-CCNC: 19 U/L (ref ?–33)
AMMONIA PLAS-SCNC: < 10 UMOL/L (ref 11–51)
ANION GAP SERPL CALC-SCNC: 10 MMOL/L (ref 7–16)
AST SERPL-CCNC: 45 U/L (ref 10–35)
BASOPHILS NFR BLD: 0.2 % (ref 0–6)
BILIRUB SERPL-MCNC: 0.2 MG/DL (ref 0.2–1)
BUN SERPL-MCNC: 12 MG/DL (ref 8–23)
CO2 SERPL-SCNC: 30 MMOL/L (ref 22–29)
CREAT SERPL-MCNC: 0.6 MG/DL (ref 0.5–0.9)
EOSINOPHIL NFR BLD: 1.7 % (ref 0–6)
ERYTHROCYTE [DISTWIDTH] IN BLOOD BY AUTOMATED COUNT: 16.4 % (ref 11.5–14.5)
EST GLOMERULAR FILTRATION RATE: > 60 ML/MIN
GLOBULIN SER-MCNC: 3.7 GM/DL (ref 1.4–4.8)
GLUCOSE SERPL-MCNC: 119 MG/DL (ref 74–109)
GRANULOCYTES NFR BLD: 69.1 % (ref 47–80)
HCT VFR BLD CALC: 37.8 % (ref 35–47)
HGB BLD-MCNC: 11.2 GM/DL (ref 11.6–16)
LYMPHOCYTES NFR BLD AUTO: 18.6 % (ref 16–45)
MCH RBC QN AUTO: 27.6 PG (ref 27–33)
MCHC RBC AUTO-ENTMCNC: 29.6 G/DL (ref 32–36)
MCV RBC AUTO: 93.3 FL (ref 81–97)
MONOCYTES NFR BLD: 10.4 % (ref 0–9)
PLATELET # BLD: 267 K/UL (ref 130–400)
PMV BLD AUTO: 8.8 FL (ref 7.4–10.4)
PROT SERPL-MCNC: 7.9 G/DL (ref 6.6–8.7)
RBC # BLD AUTO: 4.05 M/UL (ref 3.8–5.4)
WBC # BLD AUTO: 6.3 K/UL (ref 4.2–12.2)

## 2019-11-28 PROCEDURE — 84484 ASSAY OF TROPONIN QUANT: CPT

## 2019-11-28 PROCEDURE — 80320 DRUG SCREEN QUANTALCOHOLS: CPT

## 2019-11-28 PROCEDURE — 94761 N-INVAS EAR/PLS OXIMETRY MLT: CPT

## 2019-11-28 PROCEDURE — 71046 X-RAY EXAM CHEST 2 VIEWS: CPT

## 2019-11-28 PROCEDURE — 94640 AIRWAY INHALATION TREATMENT: CPT

## 2019-11-28 PROCEDURE — 80305 DRUG TEST PRSMV DIR OPT OBS: CPT

## 2019-11-28 PROCEDURE — 96374 THER/PROPH/DIAG INJ IV PUSH: CPT

## 2019-11-28 PROCEDURE — 80053 COMPREHEN METABOLIC PANEL: CPT

## 2019-11-28 PROCEDURE — 70450 CT HEAD/BRAIN W/O DYE: CPT

## 2019-11-28 PROCEDURE — 85025 COMPLETE CBC W/AUTO DIFF WBC: CPT

## 2019-11-28 PROCEDURE — 99285 EMERGENCY DEPT VISIT HI MDM: CPT

## 2019-11-28 PROCEDURE — 99220: CPT

## 2019-11-28 PROCEDURE — 36600 WITHDRAWAL OF ARTERIAL BLOOD: CPT

## 2019-11-28 PROCEDURE — 82803 BLOOD GASES ANY COMBINATION: CPT

## 2019-11-28 PROCEDURE — 96365 THER/PROPH/DIAG IV INF INIT: CPT

## 2019-11-28 PROCEDURE — 93010 ELECTROCARDIOGRAM REPORT: CPT

## 2019-11-28 PROCEDURE — 80048 BASIC METABOLIC PNL TOTAL CA: CPT

## 2019-11-28 PROCEDURE — 81001 URINALYSIS AUTO W/SCOPE: CPT

## 2019-11-28 PROCEDURE — 82140 ASSAY OF AMMONIA: CPT

## 2019-11-28 PROCEDURE — 82375 ASSAY CARBOXYHB QUANT: CPT

## 2019-11-28 PROCEDURE — 93005 ELECTROCARDIOGRAM TRACING: CPT

## 2019-11-28 NOTE — EMERGENCY DEPARTMENT RECORD
History of Present Illness





- General


Chief Complaint: Cough


Stated Complaint: EAMON


Time Seen by Provider: 19 21:11


Source: Family


Mode of Arrival: Wheelchair


Limitations: Altered mental status





- History of Present Illness


Initial comments: 





69 yo female with a history of oxygen dependent COPD presents to ED for 

evaluation of a wet cough and confusion symptoms today. daughter at the bedside 

reports similar symptoms previously related to pneumonia.  Daughter denies known

fevers, chills.  History is limited from the patient due to confusion.


Onset/Timin


-: Days(s)


Consistency: Constant


Improves with: None


Worsens with: None


Associated Symptoms: Cough


Treatments Prior to Arrival: None





- [place] Coma Scale


Eye Response: (4) Open spontaneously


Motor Response: (6) Obeys commands


Verbal Response: (5) Oriented


[place] Total: 15





- Related Data


                                  Previous Rx's











 Medication  Instructions  Recorded


 


Acetaminophen [Tylenol 500Mg Tab] 1,000 mg PO Q6H PRN  tablet 04/10/19


 


Albuterol Sulfate 0.083% [Neb] 2.5 mg INH RESP.Q4H.WA PRN #2 box 04/10/19





[Albuterol Sulfate]  


 


Docusate Sodium [Colace] 100 mg PO BID  cap 04/10/19


 


Fluoxetine HCl [Prozac] 40 mg PO DAILY  capsule 04/10/19


 


Fluticasone/Vilanterol 200/25 1 puff INH DAILY #1 inhaler 04/10/19





[Breo Ellipta 200-25 Mcg INH]  


 


Oxycodone HCl/Acetaminophen 1 each PO Q8H PRN  tablet 04/10/19





[Percocet 10mg/325mg]  


 


Quinapril HCl [Accupril] 40 mg PO DAILY  tablet 04/10/19


 


Ranitidine HCl [Zantac] 150 mg PO BID  tablet 04/10/19











                                    Allergies











Allergy/AdvReac Type Severity Reaction Status Date / Time


 


diphenhydramine AdvReac  FATIGUE Verified 19 20:14





[From Benadryl]     














Review of Systems


ROS unobtainable: Due to mental status


Respiratory: Reports: Cough


Neurological: Reports: Confusion





Past Medical History





- SOCIAL HISTORY


Smoking Status: Light tobacco smoker (<10/day)


Alcohol Use: Rare


Drug Use: None





- RESPIRATORY


Hx Respiratory Disorders: Yes


Hx COPD: Yes


Hx Pneumonia: Yes





- CARDIOVASCULAR


Hx Cardio Disorders: Yes


Hx Hypertension: Yes





- NEURO


Hx Neuro Disorders: No





- GI


Hx GI Disorders: Yes


Hx Reflux: Yes


Hx Pancreatitis: Yes





- 


Hx Genitourinary Disorders: No





- ENDOCRINE


Hx Endocrine Disorders: No


Hx Diabetes: No


Hx Thyroid Disease: No





- MUSCULOSKELETAL


Hx Musculoskeletal Disorders: Yes





- PSYCH


Hx Psych Problems: Yes


Hx Anxiety: Yes


Hx Depression: Yes





- HEMATOLOGY/ONCOLOGY


Hx Hematology/Oncology Disorders: Yes


Hx Cancer: Yes (Ovarian Cancer)


Hx Chemotherapy: Yes (2018)


Hx Radiation Therapy: Yes (2018)





Family Medical History


Any Significant Family History?: Yes


Hx Cancer: Father, Brother/Sister


Hx Diabetes: Brother/Sister





Physical Exam





- General


General Appearance: Alert, Cooperative, Moderate distress


Limitations: Altered mental status





- Head


Head exam: Atraumatic, Normocephalic, Normal inspection


Head exam detail: negative: Abrasion, Contusion, Pickard's sign, General tendern

ess, Hematoma, Laceration





- Eye


Eye exam: Other (Pinpoint pupils on examination).  negative: Conjunctival 

injection, Periorbital swelling, Periorbital tenderness, Scleral icterus





- ENT


Ear exam: negative: Auricular hematoma, Auricular trauma


Nasal Exam: negative: Active bleeding, Discharge, Dried blood, Foreign body


Mouth exam: negative: Drooling, Laceration, Muffled voice, Tongue elevation





- Neck


Neck exam: Normal inspection.  negative: Meningismus, Tenderness





- Respiratory


Respiratory exam: Decreased breath sounds.  negative: Rales, Respiratory 

distress, Rhonchi, Stridor





- Cardiovascular


Cardiovascular Exam: Normal rhythm, Normal heart sounds, Tachycardia





- GI/Abdominal


GI/Abdominal exam: Soft.  negative: Rebound, Rigid, Tenderness





- Rectal


Rectal exam: Deferred





- 


 exam: Deferred





- Extremities


Extremities exam: Normal inspection.  negative: Pedal edema, Tenderness





- Back


Back exam: Denies: CVA tenderness (R), CVA tenderness (L)





- Neurological


Neurological exam: Altered, Other (Awake, arouses to name, cannot answer 

questions on examination.)





- Psychiatric


Psychiatric exam: Normal affect, Normal mood





- Skin


Skin exam: Normal color.  negative: Abrasion


Type of lesion: negative: abrasion





Course





- Reevaluation(s)


Reevaluation #1: 





19 21:27


EKG: NSR 97


Borderline axis deviation, normal intervals


Non-specific ST-T wave changes.


Reevaluation #2: 





19 22:03


Laboratory studies were reviewed and appear grossly unremarkable for an acute 

process except for the following:


Troponin 0.022


Hgb 11.2





CT Brain:


No acute process





CXR:


Right sided infusaport


No acute infiltrate identified








Reevaluation #3: 





19 22:35


Attempted to obtain UA sample, patient spontaneously voided into commode prior 

to placing hat.


Straight cath was unsuccessful for obtaining UA immediately following.


Reevaluation #4: 





19 22:54


Patient's family members were updated on all results.


Will admit for further evaluation and initiate treatment for CAP at this time 

given the patient's history and examination.


Zosyn and Levaquin ordered to infuse.


Patient's daughter is in agreement with the plan for admission (declined 

transfer for possible specialist consultation) at this time.


Will continue to obtain UA following admission.


Reevaluation #5: 





19 06:50


Case was discussed with Temitope Avery NP, will accept admission at this time.





Medical Decision Making





- Lab Data


Result diagrams: 


                                 19 21:20





                                 19 21:20





Disposition


Disposition: Admit


Clinical Impression: 


 Elevated troponin, Hypoxia





Altered mental status


Qualifiers:


 Altered mental status type: unspecified Qualified Code(s): R41.82 - Altered 

mental status, unspecified





CAP (community acquired pneumonia)


Qualifiers:


 Laterality: unspecified laterality Qualified Code(s): J18.9 - Pneumonia, 

unspecified organism





COPD (chronic obstructive pulmonary disease)


Qualifiers:


 COPD type: unspecified COPD Qualified Code(s): J44.9 - Chronic obstructive 

pulmonary disease, unspecified





Disposition: Still a Patient at [facility]


Decision to Admit: Admit from ER


Decision to Admit Date: 19


Decision to Admit Time: 22:57


Condition: (2) Stable


Time of Disposition: 22:57





Quality





- Quality Measures


Quality Measures: N/A





- Blood Pressure Screening


Does Patient Have Any of the Following: No


Blood Pressure Classification: Normal BP Reading


Systolic Measurement: 111


Diastolic Measurement: 59


Screening for High Blood Pressure: < Normal BP, F/U Not Required > []

## 2019-11-28 NOTE — CT SCAN REPORT
EXAMINATION: CT Head without Contrast

EXAM DATE:  11/28/2019 9:55 PM  



TECHNIQUE: Routine axial CT was acquired from skull base through vertex without contrast. Sagittal an
d coronal isotropic reformatted images are reviewed. 



INDICATION:  confusion, history of pneumonia.  

COMPARISON:  4/6/2019 CT



ENCOUNTER: Not applicable

HAND DOMINANCE: Unknown.

_________________________



FINDINGS:



No intracranial fluid collection or hemorrhage. 



Brain attenuation and configuration are within normal limits. Age-appropriate CSF spaces.



No significant extraneous finding. 

_________________________



IMPRESSION:



Normal intracranial exam





Dictated by: Juanito Alvarez MD on 11/28/2019 9:56 PM.

Electronically signed by: Juanito Alvarez MD on 11/28/2019 9:58 PM.

## 2019-11-28 NOTE — RADIOLOGY REPORT
EXAMINATION: Two View Chest Radiographs

EXAM DATE:  11/28/2019 10:12 PM



TECHNIQUE:  Frontal and lateral views



INDICATION:  confusion, history of pneumonia

COMPARISON:  None



ENCOUNTER: Not applicable

_________________________



FINDINGS:



Cardiomegaly. Right Ilbdkv-t-Lued catheter. No infiltrate or effusion.

_________________________



IMPRESSION:



Cardiomegaly. No acute process



Dictated by: Perla Taylor DO on 11/28/2019 10:36 PM.

Electronically signed by: Perla Taylor DO on 11/28/2019 10:37 PM.

## 2019-11-29 LAB
ABSOLUTE NEUTROPHIL COUNT: 3.89
AMPHETAMINE SCREEN URINE: NOT DETECTED
ANION GAP SERPL CALC-SCNC: 7 MMOL/L (ref 7–16)
APPEARANCE UR: CLEAR
ARTERIAL BLOOD GAS BASE EXCESS: 2.2 MMOL/L (ref -2–3)
ARTERIAL BLOOD GAS PCO2: 49.7 MMHG (ref 35–48)
ARTERIAL PATENCY WRIST A: (no result)
BACTERIA #/AREA URNS HPF: (no result) /[HPF]
BARBITURATE SCREEN URINE: NOT DETECTED
BASOPHILS NFR BLD: 0.2 % (ref 0–6)
BENZODIAZEPINE SCREEN URINE: NOT DETECTED
BILIRUB UR-MCNC: NEGATIVE MG/DL
BUN SERPL-MCNC: 9 MG/DL (ref 8–23)
CO2 SERPL-SCNC: 32 MMOL/L (ref 22–29)
COCAINE SCREEN URINE: NOT DETECTED
COLOR UR: YELLOW
CREAT SERPL-MCNC: 0.6 MG/DL (ref 0.5–0.9)
EOSINOPHIL NFR BLD: 1.6 % (ref 0–6)
EPI CELLS #/AREA URNS HPF: (no result) /[HPF]
ERYTHROCYTE [DISTWIDTH] IN BLOOD BY AUTOMATED COUNT: 16.4 % (ref 11.5–14.5)
EST GLOMERULAR FILTRATION RATE: > 60 ML/MIN
GLUCOSE SERPL-MCNC: 109 MG/DL (ref 74–109)
GLUCOSE UR STRIP-MCNC: NEGATIVE MG/DL
GRANULOCYTES NFR BLD: 70.9 % (ref 47–80)
HCO3 BLDA-SCNC: 27.2 MMOL/L (ref 18–23)
HCT VFR BLD CALC: 32.5 % (ref 35–47)
HGB BLD-MCNC: 9.2 GM/DL (ref 11.6–16)
INHALED O2 CONCENTRATION: 95 %
KETONES UR QL STRIP: NEGATIVE
LYMPHOCYTES NFR BLD AUTO: 16.6 % (ref 16–45)
MCH RBC QN AUTO: 26.8 PG (ref 27–33)
MCHC RBC AUTO-ENTMCNC: 28.3 G/DL (ref 32–36)
MCV RBC AUTO: 94.8 FL (ref 81–97)
METHADONE SCREEN URINE: DETECTED
METHAMPHETAMINE SCREEN: NOT DETECTED
MONOCYTES NFR BLD: 10.7 % (ref 0–9)
NITRITE UR QL STRIP: NEGATIVE
OPIATE SCREEN URINE: DETECTED
OXYCODONE SCREEN URINE: DETECTED
PHENCYCLIDINE SCREEN URINE: NOT DETECTED
PLATELET # BLD: 207 K/UL (ref 130–400)
PMV BLD AUTO: 8.1 FL (ref 7.4–10.4)
PO2 BLDA: 88 MMHG (ref 83–108)
PROPOXYPHENE SCREEN URINE: NOT DETECTED
PROT UR QL STRIP: NEGATIVE
RBC # BLD AUTO: 3.43 M/UL (ref 3.8–5.4)
RBC # UR STRIP: (no result) /UL
THC SCREEN URINE: DETECTED
TRICYCLIC ANTIDEPRESSANT SCRN: NOT DETECTED
URINE LEUKOCYTE ESTERASE: NEGATIVE
UROBILINOGEN UR STRIP-ACNC: 0.2 E.U./DL (ref 0.2–1)
WBC # BLD AUTO: 5.5 K/UL (ref 4.2–12.2)
WBC #/AREA URNS HPF: (no result) /[HPF]

## 2019-11-29 RX ADMIN — NICOTINE SCH PATCH: 21 PATCH, EXTENDED RELEASE TOPICAL at 09:32

## 2019-11-29 RX ADMIN — IPRATROPIUM BROMIDE AND ALBUTEROL SULFATE SCH: .5; 2.5 SOLUTION RESPIRATORY (INHALATION) at 07:36

## 2019-11-29 RX ADMIN — IPRATROPIUM BROMIDE AND ALBUTEROL SULFATE SCH: .5; 2.5 SOLUTION RESPIRATORY (INHALATION) at 15:34

## 2019-11-29 RX ADMIN — IPRATROPIUM BROMIDE AND ALBUTEROL SULFATE SCH ML: .5; 2.5 SOLUTION RESPIRATORY (INHALATION) at 09:33

## 2019-11-29 RX ADMIN — NICOTINE SCH PATCH: 21 PATCH, EXTENDED RELEASE TOPICAL at 00:48

## 2019-11-29 NOTE — HISTORY & PHYSICAL
History of Present Illness





- Date of Service


Date of Service for History & Physical: 11/29/19





- History of Present Illness


Admitting Diagnosis: Altered mental status.  Hypoxia.  Possible CAP


History of Present Illness: 





68 year old female patient brought in to ED by family members for worsening 

confusion throughout the day with a wet, harsh cough.  Patient has a history of 

COPD and is oxygen dependent at home.  Daughter reports similar symptoms in the 

past when patient has had pneumonia.  Denies any fever, chills, or chest pain.  

Patient has a history of ovarian cancer which is currently in remission, several

back surgeries and resulting chronic back pain, and anxiety.  Patient is on 

several opiates chronically, including Morphine ER 100mg BID and Oxycodone 15mg 

q4h for chronic back pain.





PCP: Dr. Ace Riddle





ED Course:


CXR: cardiomegaly, no acute process


head CT: no acute intracranial findings


UA, CBC, CMP unremarkable


Ammonia neg


Trop 0.02


EKG: NSR 97, non-specific ST-T wave changes





11/29/19: Patient oriented to self, place, and month.  Resting comfortably in 

bed, no acute distress.  Continued confusion from baseline per daughter.  No 

neuro deficits noted on exam.


    














Travel Screening





- Travel/Exposure Within Last 30 Days


Have you traveled within the last 30 days?: No





- Travel/Exposure Within Last Year


Have you traveled outside the [place] in the last year?: No





- Additonal Travel Details


Have you been exposed to anyone with a communicable illness?: No





- Travel Symptoms


Symptom Screening: None





Review of Systems


Reviewed: No additional complaints except as noted below


Constitutional: Reports: As per HPI


Eyes: Reports: As per HPI


ENT: Reports: As per HPI


Respiratory: Reports: As per HPI, Cough


Cardiovascular: Reports: As per HPI


Endocrine: Reports: As per HPI


Gastrointestinal: Reports: As per HPI


Genitourinary: Reports: As per HPI


Musculoskeletal: Reports: As per HPI


Skin: Reports: As per HPI


Neurological: Reports: As per HPI, Confusion


Psychiatric: Reports: As per HPI


Hematological/Lymphatic: Reports: As per HPI





Past Medical History





- SOCIAL HISTORY


Smoking Status: Light tobacco smoker (<10/day)


Alcohol Use: Rare


Drug Use: None





- RESPIRATORY


Hx Respiratory Disorders: Yes


Hx COPD: Yes


Hx Pneumonia: Yes





- CARDIOVASCULAR


Hx Cardio Disorders: Yes


Hx Hypertension: Yes





- NEURO


Hx Neuro Disorders: No





- GI


Hx GI Disorders: Yes


Hx Reflux: Yes


Hx Pancreatitis: Yes





- 


Hx Genitourinary Disorders: No





- ENDOCRINE


Hx Endocrine Disorders: No


Hx Diabetes: No


Hx Thyroid Disease: No





- MUSCULOSKELETAL


Hx Musculoskeletal Disorders: Yes





- PSYCH


Hx Psych Problems: Yes


Hx Anxiety: Yes


Hx Depression: Yes





- HEMATOLOGY/ONCOLOGY


Hx Hematology/Oncology Disorders: Yes


Hx Cancer: Yes (Ovarian Cancer)


Hx Chemotherapy: Yes (12/2018)


Hx Radiation Therapy: Yes (12/2018)





Family Medical History


Any Significant Family History?: Yes


Hx Cancer: Father, Brother/Sister


Hx Diabetes: Brother/Sister





H&P Meds/Allergies





- Allergies


Allergies: 


                                    Allergies











Allergy/AdvReac Type Severity Reaction Status Date / Time


 


diphenhydramine AdvReac  FATIGUE Verified 06/07/19 20:14





[From Benadryl]     














- Home Medications


                                  Previous Rx's











 Medication  Instructions  Recorded


 


Acetaminophen [Tylenol 500Mg Tab] 1,000 mg PO Q6H PRN  tablet 04/10/19


 


Albuterol Sulfate 0.083% [Neb] 2.5 mg INH RESP.Q4H.WA PRN #2 box 04/10/19





[Albuterol Sulfate]  


 


Docusate Sodium [Colace] 100 mg PO BID  cap 04/10/19


 


Fluoxetine HCl [Prozac] 40 mg PO DAILY  capsule 04/10/19


 


Fluticasone/Vilanterol 200/25 1 puff INH DAILY #1 inhaler 04/10/19





[Breo Ellipta 200-25 Mcg INH]  


 


Ranitidine HCl [Zantac] 150 mg PO BID  tablet 04/10/19














- Active Medications


Active Medications: 


                               Current Medications





Acetaminophen (Tylenol 500mg Tab)  1,000 mg PO Q6H PRN


   PRN Reason: PAIN - MILD(1-4)/FEVER


Albuterol Sulfate (Albuterol Sulfate)  2.5 mg INH RESP.Q2H PRN


   PRN Reason: DIFFICULTY IN BREATHING


Albuterol/Ipratropium (Duoneb)  3 ml INH RESP.Q4H.WA GOLDY


   Last Admin: 11/29/19 07:36 Dose:  Not Given


   Documented by: 


Fluoxetine HCl (Prozac)  40 mg PO DAILY GOLDY


Fluoxetine HCl (Prozac)  40 mg PO DAILY GOLDY


Sodium Chloride ()  1,000 mls @ 0 mls/hr IV .Q0M GOLDY


   Last Infusion: 11/29/19 00:00 Dose:  Infused


   Documented by: 


Sodium Chloride ()  1,000 mls @ 100 mls/hr IV .Q10H ONE


   Stop: 11/29/19 10:10


   Last Admin: 11/29/19 00:44 Dose:  100 mls/hr


   Documented by: 


Levofloxacin/Dextrose (Levaquin 750mg Ivpb)  750 mg in 150 mls @ 125 mls/hr IVPB

 Q24H [facility]


   Stop: 12/04/19 23:01


Nicotine (Nicotine 21mg)  1 patch TD 0900 [facility]


   Last Admin: 11/29/19 00:48 Dose:  1 patch


   Documented by: 











Physical Exam





- Vital Signs


Vital Signs: 


                            Vital Signs - Last 24 Hrs











  Temp Pulse Pulse Pulse Resp BP BP


 


 11/29/19 08:47      18  


 


 11/29/19 06:00  99.0 F    82  18   141/72


 


 11/29/19 00:26     73  18  


 


 11/29/19 00:11  99.2 F   73  73  16   111/59


 


 11/29/19 00:01   79    20  111/59 


 


 11/28/19 22:45  99.3 F      


 


 11/28/19 22:39     86  20   127/66


 


 11/28/19 21:35     92 H  20  


 


 11/28/19 21:15   106 H    20  137/103 














  Pulse Ox


 


 11/29/19 08:47  98


 


 11/29/19 06:00  95


 


 11/29/19 00:26 


 


 11/29/19 00:11  96


 


 11/29/19 00:01  94 L


 


 11/28/19 22:45 


 


 11/28/19 22:39  94 L


 


 11/28/19 21:35  94 L


 


 11/28/19 21:15  88 L














- General


General Appearance: Alert, Cooperative, No acute distress


Limitations: Altered mental status





- Head


Head exam: Atraumatic, Normocephalic, Normal inspection


Head exam detail: negative: Abrasion, Contusion, Pickard's sign, General 

tenderness, Hematoma, Laceration





- Eye


Eye exam: Other (Pinpoint pupils on examination).  negative: Conjunctival 

injection, Periorbital swelling, Periorbital tenderness, Scleral icterus





- ENT


ENT exam: Mucous membranes moist, Normal external ear exam


Ear exam: negative: Auricular hematoma, Auricular trauma


Nasal Exam: negative: Active bleeding, Discharge, Dried blood, Foreign body


Mouth exam: negative: Drooling, Laceration, Muffled voice, Tongue elevation





- Neck


Neck exam: Normal inspection.  negative: Meningismus, Tenderness





- Respiratory


Respiratory exam: Decreased breath sounds.  negative: Rales, Respiratory 

distress, Rhonchi, Stridor





- Cardiovascular


Cardiovascular Exam: Regular rate, Normal rhythm, Normal heart sounds


Peripheral Pulses: 2+: Radial (R), Radial (L), Dorsalis Pedis (R), Dorsalis 

Pedis (L)





- GI/Abdominal


GI/Abdominal exam: Soft.  negative: Rebound, Rigid, Tenderness





- Rectal


Rectal exam: Deferred





- 


 exam: Deferred





- Extremities


Extremities exam: Normal inspection.  negative: Pedal edema, Tenderness





- Back


Back exam: Denies: CVA tenderness (R), CVA tenderness (L)





- Neurological


Neurological exam: Altered, Other (answers questions spontaneously, not oriented

 to year or situation)





- Psychiatric


Psychiatric exam: Normal affect, Normal mood





- Skin


Skin exam: Normal color.  negative: Abrasion


Type of lesion: negative: abrasion





Results





- Labs


Result Diagrams: 


                                 11/28/19 21:20





                                 11/28/19 21:20


Labs Last 24 Hours: 


                         Laboratory Results - last 24 hr











  11/28/19 11/28/19 11/29/19





  21:20 21:20 06:00


 


WBC  6.3  


 


RBC  4.05  


 


Hgb  11.2 L  


 


Hct  37.8  


 


MCV  93.3  


 


MCH  27.6  


 


MCHC  29.6 L  


 


RDW  16.4 H  


 


Plt Count  267  


 


MPV  8.8  


 


Gran %  69.1  


 


Lymphocytes %  18.6  


 


Monocytes %  10.4 H  


 


Eosinophils %  1.7  


 


Basophils %  0.2  


 


Absolute Neutrophils  4.37  


 


Sodium   138 


 


Potassium   3.9 


 


Chloride   98 


 


Carbon Dioxide   30.0 H 


 


Anion Gap   10.0 


 


BUN   12 


 


Creatinine   0.6 


 


Estimated GFR   > 60 


 


Random Glucose   119 H 


 


Calcium   9.3 


 


Total Bilirubin   0.20 


 


AST   45 H 


 


ALT   19 


 


Alkaline Phosphatase   66 


 


Ammonia   < 10 L 


 


Troponin T   0.022 H 


 


Total Protein   7.9 


 


Albumin   4.2 


 


Globulin   3.7 


 


Albumin/Globulin Ratio   1.1 


 


Urine Color    Yellow


 


Urine Appearance    Clear


 


Urine pH    6.0


 


Ur Specific Gravity    1.025


 


Urine Protein    Negative


 


Urine Glucose (UA)    Negative


 


Urine Ketones    Negative


 


Urine Blood    Moderate


 


Urine Nitrite    Negative


 


Urine Bilirubin    Negative


 


Urine Urobilinogen    0.2


 


Ur Leukocyte Esterase    Negative


 


Urine RBC    7 - 10


 


Urine WBC    0 - 2


 


Ur Epithelial Cells    0 - 2


 


Urine Bacteria    Few














  11/29/19





  06:13


 


WBC 


 


RBC 


 


Hgb 


 


Hct 


 


MCV 


 


MCH 


 


MCHC 


 


RDW 


 


Plt Count 


 


MPV 


 


Gran % 


 


Lymphocytes % 


 


Monocytes % 


 


Eosinophils % 


 


Basophils % 


 


Absolute Neutrophils 


 


Sodium 


 


Potassium 


 


Chloride 


 


Carbon Dioxide 


 


Anion Gap 


 


BUN 


 


Creatinine 


 


Estimated GFR 


 


Random Glucose 


 


Calcium 


 


Total Bilirubin 


 


AST 


 


ALT 


 


Alkaline Phosphatase 


 


Ammonia 


 


Troponin T  0.012 H


 


Total Protein 


 


Albumin 


 


Globulin 


 


Albumin/Globulin Ratio 


 


Urine Color 


 


Urine Appearance 


 


Urine pH 


 


Ur Specific Gravity 


 


Urine Protein 


 


Urine Glucose (UA) 


 


Urine Ketones 


 


Urine Blood 


 


Urine Nitrite 


 


Urine Bilirubin 


 


Urine Urobilinogen 


 


Ur Leukocyte Esterase 


 


Urine RBC 


 


Urine WBC 


 


Ur Epithelial Cells 


 


Urine Bacteria 














- Imaging and Cardiology


  ** CT scan - head


Status: Report reviewed





VTE H&P Assessment





- Risk for VTE


Risk for VTE: Yes


Risk Level: Moderate


Risk Assessment Date: 11/29/19


Risk Assessment Time: 11:43


VTE Orders Placed or Will Be Placed: Yes





Plan





- Inpatient Certification


Inpatient Certification: 


Admit to inpatient care: Based on my medical assessment, after consideration of 

patient's risk factors (age, co-morbidities and patient presenting symptoms and 

acuity), I expect that this patient will remain in the hospital greater than or 

equal to two midnights and that the services needed warrant inpatient care 

because:





Patient Risk Factors: [age, confusion, COPD, home oxygen dependent]





Estimated length of stay: The patient may reasonably be expected to be 

discharged or transferred to a hospital within 36-96 hours after admission to 

[facility].





Services needed: [antibiotics, cardiac monitor, serial labs, IV fluids]





Post hospital care (if known): []





I certify that my determination is in accordance with my understanding of 

Medicare requirements for reasonable and necessary inpatient services.





11/29/19 11:43








- Detailed Diagnosis and Plan


(1) Altered mental status


Current Visit: Yes   Status: Acute   


Qualifiers: 


   Altered mental status type: unspecified   Qualified Code(s): R41.82 - Altered

 mental status, unspecified   


Base Code: R41.82 - ALTERED MENTAL STATUS, UNSPECIFIED   Comment: 11/29/19:


- Altered LOC from baseline, no neuro deficits


- Head CT: no acute findings


- CXR: No acute process


- UA, CBC, CMP, ammonia unremarkable


- Troponin 0.021, 0.012


- ABG: pCO2 49, HC03 27, pH 7.36, at patient's baseline


- ETOH <0.01


- Urine drug screen positive for opiates, methadone, oxycodone, and marijuana


- [employer] prescription drug use: Morphine ER 100mg BID, Oxycodone 15mg q4h, 

Xanax 1mg BID, Trazadone 300mg qhs, Zyprexa 10mg qhs


- Confusion caused by reported hypoxemia by family vs overmedicated


- Cardiac monitor, VS


- Continue to monitor, decreasing opiate use until confusion clears   





(2) COPD with exacerbation


Current Visit: Yes   Status: Acute   Base Code: J44.1 - CHRONIC OBSTRUCTIVE 

PULMONARY DISEASE W (ACUTE) EXACERBATION   Comment: 11/29/19:


- CXR: No acute findings


- Levaquin and Zosyn started in ED for possible PNA


- Afebrile


- Continue supplemental oxygen at 2L per home use


- Continue with Breo, supplemental oxygen, and Levaquin at this time   





(3) Chronic pain


Current Visit: No   Status: Acute   Base Code: G89.29 - OTHER CHRONIC PAIN   

Comment: 11/29/19:


- Daugther reports last dose of pain medication 0600 11/28/19


- Patient now asking for pain medication


- Home dose Morphine 100mg Q12h, Oxycodone 15mg q4h


- Restart Morphine 90mg Q12H at this time


-pt tolerating decreased dosing of pain medication, moving in bed with no 

assistance, up with assist


-PT eval today


-continue Morphine 60mg BID with percocet 10mg q4hr PRN


4/8/19:


- Pain 5/10 in severity.


- Hx of chronic low back pain, s/p lumbar laminectomy in 1989. 


- S/P  recent chemo/radiation for ovarian cancer.


- Percocet 10mg 4hr PRN, Morphine Sulfate 100mg Q12H at home. Orderd Morphine 

60mg BID. 


   





(4) DVT prophylaxis


Current Visit: No   Status: Acute   Base Code: [id number] -    Comment: 11/29/19:


- Lovenox 40mg SQ QD   





(5) Full code status


Current Visit: No   Status: Acute   Base Code: Z78.9 - OTHER SPECIFIED HEALTH 

STATUS   Comment: 11/29/19:


- Full code status.